# Patient Record
Sex: FEMALE | Race: WHITE | NOT HISPANIC OR LATINO | Employment: UNEMPLOYED | URBAN - METROPOLITAN AREA
[De-identification: names, ages, dates, MRNs, and addresses within clinical notes are randomized per-mention and may not be internally consistent; named-entity substitution may affect disease eponyms.]

---

## 2021-01-05 ENCOUNTER — OFFICE VISIT (OUTPATIENT)
Dept: OBGYN CLINIC | Facility: MEDICAL CENTER | Age: 21
End: 2021-01-05
Payer: COMMERCIAL

## 2021-01-05 VITALS
SYSTOLIC BLOOD PRESSURE: 122 MMHG | WEIGHT: 121.2 LBS | DIASTOLIC BLOOD PRESSURE: 74 MMHG | BODY MASS INDEX: 20.69 KG/M2 | HEIGHT: 64 IN

## 2021-01-05 DIAGNOSIS — Z30.011 ENCOUNTER FOR BCP (BIRTH CONTROL PILLS) INITIAL PRESCRIPTION: ICD-10-CM

## 2021-01-05 DIAGNOSIS — N92.0 MENORRHAGIA WITH REGULAR CYCLE: Primary | ICD-10-CM

## 2021-01-05 DIAGNOSIS — N94.6 DYSMENORRHEA: ICD-10-CM

## 2021-01-05 PROCEDURE — 99202 OFFICE O/P NEW SF 15 MIN: CPT | Performed by: ADVANCED PRACTICE MIDWIFE

## 2021-01-05 RX ORDER — NORGESTIMATE AND ETHINYL ESTRADIOL 0.25-0.035
1 KIT ORAL DAILY
Qty: 84 TABLET | Refills: 1 | Status: SHIPPED | OUTPATIENT
Start: 2021-01-05 | End: 2021-06-24

## 2021-01-05 NOTE — PROGRESS NOTES
Assessment:  24 y o  Joshua Mueller presenting with regular menstrual cycle with heavy bleeding days 2-4 and cramping  Plan:  Diagnoses and all orders for this visit:    Menorrhagia with regular cycle  -     norgestimate-ethinyl estradiol (ORTHO-CYCLEN) 0 25-35 MG-MCG per tablet; Take 1 tablet by mouth daily    Dysmenorrhea  -     norgestimate-ethinyl estradiol (ORTHO-CYCLEN) 0 25-35 MG-MCG per tablet; Take 1 tablet by mouth daily    Encounter for BCP (birth control pills) initial prescription  -     norgestimate-ethinyl estradiol (ORTHO-CYCLEN) 0 25-35 MG-MCG per tablet; Take 1 tablet by mouth daily      1  At this time Darral Nissen fees that an OCP is best hormonal option for current lifestyle  Risk/benefits, side effects and administration reviewed  Questions asked and answered  Verbalizes understanding  2  Not sexually active  Reviewed information related to condom use and STI  If any change in activity  Verbalizes understanding  I personally spent over half of a total 20 minutes face to face with the patient in counseling and discussion and/or coordination of care as described above      __________________________________________________________________    Subjective   Lacy Joseph is a 24 y o  Joshua Mueller with regular menses who is not sexually active presenting with complaints of heavy periods and cramping  Patient reports she has been dealing with this problem for 3-4 yrs  She reports during this time that her periods have been occurring every 28-32 days and lasting 5-8 days  She notes her heaviest flow lasts 2 days, during which time she uses a super tampons every 1-2 hours  She reports dysmenorrhea that occurs first 1-2 days of flow  She also has bloating/fluid retention (mild) and mild acne and pms  She denies intermenstrual spotting  She denies a hx of easy bruising,or significant bleeding with surgical/dental procedures      The following portions of the patient's history were reviewed and updated as appropriate: allergies, current medications, past family history, past medical history, past social history, past surgical history and problem list     Review of Systems  Pertinent items are noted in HPI  Objective  /74 (BP Location: Left arm, Patient Position: Sitting, Cuff Size: Standard)   Ht 5' 4" (1 626 m)   Wt 55 kg (121 lb 3 2 oz)   LMP 12/09/2020 (Exact Date)   BMI 20 80 kg/m²      Physical Exam:  General:   appears stated age, cooperative, alert normal mood and affect   Neck: Neck: normal, supple,trachea midline, no masses   Heart: regular rate and rhythm, S1, S2 normal, no murmur, click, rub or gallop   Lungs: clear to auscultation bilaterally   Abdomen: soft, non-tender, without masses or organomegaly   Vulva: deferred   Vagina: not evaluated   Uterus: not examined   Adnexa: not evaluated         Lab Review  Labs: At this time no labs have been ordered  Imaging  At this time no imaging has been ordered  Will try hormonal oral contraceptive prior to testing

## 2021-04-01 ENCOUNTER — OFFICE VISIT (OUTPATIENT)
Dept: OBGYN CLINIC | Facility: MEDICAL CENTER | Age: 21
End: 2021-04-01
Payer: COMMERCIAL

## 2021-04-01 VITALS
DIASTOLIC BLOOD PRESSURE: 72 MMHG | BODY MASS INDEX: 20.14 KG/M2 | SYSTOLIC BLOOD PRESSURE: 114 MMHG | WEIGHT: 118 LBS | HEIGHT: 64 IN

## 2021-04-01 DIAGNOSIS — N92.0 MENORRHAGIA WITH REGULAR CYCLE: ICD-10-CM

## 2021-04-01 DIAGNOSIS — N94.6 DYSMENORRHEA: ICD-10-CM

## 2021-04-01 DIAGNOSIS — Z30.41 SURVEILLANCE FOR BIRTH CONTROL, ORAL CONTRACEPTIVES: Primary | ICD-10-CM

## 2021-04-01 PROCEDURE — 99213 OFFICE O/P EST LOW 20 MIN: CPT | Performed by: ADVANCED PRACTICE MIDWIFE

## 2021-04-01 NOTE — PROGRESS NOTES
Assessment Diagnoses and all orders for this visit:    Dysmenorrhea    Menorrhagia with regular cycle    Encounter for BCP (birth control pills) continuation  - Doing well on OCP  - Will call if cramping has not improved after this pack   - RTO 1 yr    Plan  24 y o  female continuing OCP (estrogen/progesterone)  I personally spent over half of a total 15 minutes face to face with the patient in counseling and discussion and/or coordination of care as described above  Subjective      Claudia Pugh is a 24 y o  female who presents for contraception counseling  The patient does not have complaints today  The patient is sexually active  Pertinent past medical history: none  States that she had cramping the week before menses started for the first 2 months  She is currently on the week before her period and is not experiencing cramping  No problems with administration and is happy with how she is feeling at this time  Patient Active Problem List   Diagnosis    Dysmenorrhea       Past Medical History:   Diagnosis Date    Menorrhagia with regular cycle        History reviewed  No pertinent surgical history      Family History   Problem Relation Age of Onset    Hypertension Father     Seizures Sister     Diabetes Paternal Grandfather        Social History     Socioeconomic History    Marital status: Single     Spouse name: Not on file    Number of children: Not on file    Years of education: Not on file    Highest education level: Not on file   Occupational History    Not on file   Social Needs    Financial resource strain: Not on file    Food insecurity     Worry: Not on file     Inability: Not on file   [employer] needs     Medical: Not on file     Non-medical: Not on file   Tobacco Use    Smoking status: Never Smoker    Smokeless tobacco: Never Used   Substance and Sexual Activity    Alcohol use: Never     Frequency: Never     Comment: social    Drug use: Never    Sexual activity: Never Lifestyle    Physical activity     Days per week: Not on file     Minutes per session: Not on file    Stress: Not on file   Relationships    Social connections     Talks on phone: Not on file     Gets together: Not on file     Attends Mormonism service: Not on file     Active member of club or organization: Not on file     Attends meetings of clubs or organizations: Not on file     Relationship status: Not on file    Intimate partner violence     Fear of current or ex partner: Not on file     Emotionally abused: Not on file     Physically abused: Not on file     Forced sexual activity: Not on file   Other Topics Concern    Not on file   Social History Narrative    Not on file          Current Outpatient Medications:     norgestimate-ethinyl estradiol (ORTHO-CYCLEN) 0 25-35 MG-MCG per tablet, Take 1 tablet by mouth daily, Disp: 84 tablet, Rfl: 1    No Known Allergies    Review of Systems  Constitutional :no fever, feels well, no tiredness, no recent weight gain or loss  ENT: no ear ache, no loss of hearing, no nosebleeds or nasal discharge, no sore throat or hoarseness  Cardiovascular: no complaints of slow or fast heart beat, no chest pain, no palpitations, no leg claudication or lower extremity edema  Respiratory: no complaints of shortness of shortness of breath, no ATWOOD  Breasts:no complaints of breast pain, breast lump, or nipple discharge  Gastrointestinal: no complaints of abdominal pain, constipation, nausea, vomiting, or diarrhea or bloody stools  Genitourinary : no complaints of dysuria, incontinence, pelvic pain, no dysmenorrhea, vaginal discharge or abnormal vaginal bleeding and as noted in HPI  Musculoskeletal: no complaints of arthralgia, no myalgia, no joint swelling or stiffness, no limb pain or swelling    Integumentary: no complaints of skin rash or lesion, itching or dry skin  Neurological: no complaints of headache, no confusion, no numbness or tingling, no dizziness or fainting    Objective     /72 (BP Location: Left arm, Patient Position: Sitting, Cuff Size: Standard)   Ht 5' 4" (1 626 m)   Wt 53 5 kg (118 lb)   LMP 03/09/2021 (Exact Date)   BMI 20 25 kg/m²     General:   appears stated age, cooperative, alert normal mood and affect   Neck: normal, supple,trachea midline, no masses   Heart: regular rate and rhythm, S1, S2 normal, no murmur, click, rub or gallop   Skin normal skin turgor and no rashes     Psychiatric orientation to person, place, and time: normal  mood and affect: normal

## 2021-06-24 DIAGNOSIS — N94.6 DYSMENORRHEA: ICD-10-CM

## 2021-06-24 DIAGNOSIS — N92.0 MENORRHAGIA WITH REGULAR CYCLE: ICD-10-CM

## 2021-06-24 DIAGNOSIS — Z30.011 ENCOUNTER FOR BCP (BIRTH CONTROL PILLS) INITIAL PRESCRIPTION: ICD-10-CM

## 2021-06-29 DIAGNOSIS — N94.6 DYSMENORRHEA: ICD-10-CM

## 2021-06-29 DIAGNOSIS — Z30.011 ENCOUNTER FOR BCP (BIRTH CONTROL PILLS) INITIAL PRESCRIPTION: ICD-10-CM

## 2021-06-29 DIAGNOSIS — N92.0 MENORRHAGIA WITH REGULAR CYCLE: ICD-10-CM

## 2021-06-30 ENCOUNTER — OFFICE VISIT (OUTPATIENT)
Dept: FAMILY MEDICINE CLINIC | Facility: CLINIC | Age: 21
End: 2021-06-30
Payer: COMMERCIAL

## 2021-06-30 VITALS
TEMPERATURE: 98.1 F | WEIGHT: 120 LBS | SYSTOLIC BLOOD PRESSURE: 100 MMHG | DIASTOLIC BLOOD PRESSURE: 62 MMHG | BODY MASS INDEX: 20.49 KG/M2 | HEART RATE: 64 BPM | RESPIRATION RATE: 16 BRPM | HEIGHT: 64 IN

## 2021-06-30 DIAGNOSIS — Z23 ENCOUNTER FOR IMMUNIZATION: ICD-10-CM

## 2021-06-30 DIAGNOSIS — Z00.00 ANNUAL PHYSICAL EXAM: Primary | ICD-10-CM

## 2021-06-30 DIAGNOSIS — Z11.1 SCREENING-PULMONARY TB: ICD-10-CM

## 2021-06-30 PROCEDURE — 99385 PREV VISIT NEW AGE 18-39: CPT | Performed by: NURSE PRACTITIONER

## 2021-06-30 PROCEDURE — 90471 IMMUNIZATION ADMIN: CPT

## 2021-06-30 PROCEDURE — 86580 TB INTRADERMAL TEST: CPT

## 2021-06-30 PROCEDURE — 90715 TDAP VACCINE 7 YRS/> IM: CPT

## 2021-06-30 NOTE — PROGRESS NOTES
ADULT ANNUAL PHYSICAL  [facility]    NAME: Dinh Mcelroy  AGE: 22 y o  SEX: female  : 2000     DATE: 2021     Assessment and Plan:  1  TDAP today  2  TB skin test for school program   3  F/u in 1 year for annual physical      Problem List Items Addressed This Visit     None      Visit Diagnoses     Annual physical exam    -  Primary    Encounter for immunization        Relevant Orders    TDAP VACCINE GREATER THAN OR EQUAL TO 8YO IM    Screening-pulmonary TB        Relevant Orders    TB Skin Test          Immunizations and preventive care screenings were discussed with patient today  Appropriate education was printed on patient's after visit summary  Counseling:  Alcohol/drug use: discussed moderation in alcohol intake, the recommendations for healthy alcohol use, and avoidance of illicit drug use  Dental Health: discussed importance of regular tooth brushing, flossing, and dental visits  Injury prevention: discussed safety/seat belts, safety helmets, smoke detectors, carbon dioxide detectors, and smoking near bedding or upholstery  Sexual health: discussed sexually transmitted diseases, partner selection, use of condoms, avoidance of unintended pregnancy, and contraceptive alternatives  · Exercise: the importance of regular exercise/physical activity was discussed  Recommend exercise 3-5 times per week for at least 30 minutes  Return in about 1 year (around 2022) for Annual physical      Chief Complaint:     Chief Complaint   Patient presents with    Annual Exam      History of Present Illness:     Adult Annual Physical   Patient here for a comprehensive physical exam  The patient reports no problems  Diet and Physical Activity  · Diet/Nutrition: well balanced diet, consuming 3-5 servings of fruits/vegetables daily and adequate fiber intake     · Exercise: walking, moderate cardiovascular exercise, 3-4 times a week on average and 30-60 minutes on average  Depression Screening  PHQ-9 Depression Screening    PHQ-9:   Frequency of the following problems over the past two weeks:      Little interest or pleasure in doing things: 0 - not at all  Feeling down, depressed, or hopeless: 0 - not at all  PHQ-2 Score: 0       General Health  · Sleep: sleeps well and gets 7-8 hours of sleep on average  · Hearing: normal - bilateral   · Vision: no vision problems, most recent eye exam <1 year ago and wears contacts  · Dental: regular dental visits, brushes teeth twice daily and flosses teeth occasionally  /GYN Health  · Last menstrual period: 06/22/2021  · Contraceptive method: oral contraceptives  · History of STDs?: no      Review of Systems:     Review of Systems   Constitutional: Negative  Negative for chills and fever  HENT: Negative  Negative for ear pain and sore throat  Eyes: Negative  Negative for pain and visual disturbance  Respiratory: Negative  Negative for cough and shortness of breath  Cardiovascular: Negative  Negative for chest pain, palpitations and leg swelling  Gastrointestinal: Negative  Negative for abdominal distention, abdominal pain, constipation, diarrhea, nausea and vomiting  Genitourinary: Negative  Negative for dysuria and hematuria  Musculoskeletal: Negative  Negative for arthralgias and back pain  Skin: Negative for color change and rash  Neurological: Negative  Negative for dizziness, seizures and syncope  Psychiatric/Behavioral: Negative  All other systems reviewed and are negative       Past Medical History:     Past Medical History:   Diagnosis Date    Menorrhagia with regular cycle       Past Surgical History:     Past Surgical History:   Procedure Laterality Date    WISDOM TOOTH EXTRACTION        Social History:     Social History     Socioeconomic History    Marital status: Single     Spouse name: None    Number of children: None    Years of education: None    Highest education level: None   Occupational History    None   Tobacco Use    Smoking status: Never Smoker    Smokeless tobacco: Never Used   Vaping Use    Vaping Use: Never used   Substance and Sexual Activity    Alcohol use: Never     Comment: social    Drug use: Never    Sexual activity: Never   Other Topics Concern    None   Social History Narrative    None     Social Determinants of Health     Financial Resource Strain:     Difficulty of Paying Living Expenses:    Food Insecurity:     Worried About Running Out of Food in the Last Year:     Ran Out of Food in the Last Year:    Transportation Needs:     Lack of Transportation (Medical):  Lack of Transportation (Non-Medical):    Physical Activity:     Days of Exercise per Week:     Minutes of Exercise per Session:    Stress:     Feeling of Stress :    Social Connections:     Frequency of Communication with Friends and Family:     Frequency of Social Gatherings with Friends and Family:     Attends Sikh Services:     Active Member of Clubs or Organizations:     Attends Club or Organization Meetings:     Marital Status:    Intimate Partner Violence:     Fear of Current or Ex-Partner:     Emotionally Abused:     Physically Abused:     Sexually Abused:       Family History:     Family History   Problem Relation Age of Onset    Hypertension Father     Seizures Sister     Diabetes Paternal Grandfather       Current Medications:     Current Outpatient Medications   Medication Sig Dispense Refill    Sprintec 28 0 25-35 MG-MCG per tablet TAKE ONE TABLET BY MOUTH DAILY 84 tablet 0     No current facility-administered medications for this visit  Allergies:     No Known Allergies   Physical Exam:     /62   Pulse 64   Temp 98 1 °F (36 7 °C) (Oral)   Resp 16   Ht 5' 4" (1 626 m)   Wt 54 4 kg (120 lb)   BMI 20 60 kg/m²     Physical Exam  Vitals and nursing note reviewed  Constitutional:       General: She is not in acute distress  Appearance: Normal appearance  She is well-developed  HENT:      Head: Normocephalic and atraumatic  Eyes:      Conjunctiva/sclera: Conjunctivae normal    Neck:      Vascular: No carotid bruit  Cardiovascular:      Rate and Rhythm: Normal rate and regular rhythm  Pulses: Normal pulses  Heart sounds: Normal heart sounds  No murmur heard  Pulmonary:      Effort: Pulmonary effort is normal  No respiratory distress  Breath sounds: Normal breath sounds  No wheezing  Abdominal:      General: There is no distension  Palpations: Abdomen is soft  There is no mass  Tenderness: There is no abdominal tenderness  There is no guarding or rebound  Hernia: No hernia is present  Musculoskeletal:         General: Normal range of motion  Cervical back: Normal range of motion and neck supple  Comments: Normal spine   Lymphadenopathy:      Cervical: No cervical adenopathy  Skin:     General: Skin is warm and dry  Capillary Refill: Capillary refill takes less than 2 seconds  Neurological:      General: No focal deficit present  Mental Status: She is alert and oriented to person, place, and time  Mental status is at baseline  Motor: No weakness  Gait: Gait normal    Psychiatric:         Mood and Affect: Mood normal          Behavior: Behavior normal          Thought Content:  Thought content normal          Judgment: Judgment normal           Romelia Bertrand, [address] [facility]

## 2021-06-30 NOTE — PATIENT INSTRUCTIONS

## 2021-07-02 ENCOUNTER — CLINICAL SUPPORT (OUTPATIENT)
Dept: FAMILY MEDICINE CLINIC | Facility: CLINIC | Age: 21
End: 2021-07-02
Payer: COMMERCIAL

## 2021-07-02 DIAGNOSIS — Z11.1 ENCOUNTER FOR PPD SKIN TEST READING: ICD-10-CM

## 2021-07-02 PROCEDURE — 86580 TB INTRADERMAL TEST: CPT | Performed by: NURSE PRACTITIONER

## 2021-07-06 RX ORDER — NORGESTIMATE AND ETHINYL ESTRADIOL 0.25-0.035
1 KIT ORAL DAILY
Qty: 84 TABLET | Refills: 2 | Status: SHIPPED | OUTPATIENT
Start: 2021-07-06 | End: 2021-09-15 | Stop reason: SDUPTHER

## 2021-09-15 DIAGNOSIS — N94.6 DYSMENORRHEA: ICD-10-CM

## 2021-09-15 DIAGNOSIS — N92.0 MENORRHAGIA WITH REGULAR CYCLE: ICD-10-CM

## 2021-09-15 DIAGNOSIS — Z30.011 ENCOUNTER FOR BCP (BIRTH CONTROL PILLS) INITIAL PRESCRIPTION: ICD-10-CM

## 2021-09-15 RX ORDER — NORGESTIMATE AND ETHINYL ESTRADIOL 0.25-0.035
1 KIT ORAL DAILY
Qty: 84 TABLET | Refills: 2 | Status: SHIPPED | OUTPATIENT
Start: 2021-09-15 | End: 2022-04-13 | Stop reason: SDUPTHER

## 2022-03-21 ENCOUNTER — TELEPHONE (OUTPATIENT)
Dept: FAMILY MEDICINE CLINIC | Facility: CLINIC | Age: 22
End: 2022-03-21

## 2022-04-13 ENCOUNTER — ANNUAL EXAM (OUTPATIENT)
Dept: OBGYN CLINIC | Facility: MEDICAL CENTER | Age: 22
End: 2022-04-13
Payer: COMMERCIAL

## 2022-04-13 VITALS
WEIGHT: 127.5 LBS | SYSTOLIC BLOOD PRESSURE: 118 MMHG | BODY MASS INDEX: 21.77 KG/M2 | HEIGHT: 64 IN | DIASTOLIC BLOOD PRESSURE: 70 MMHG

## 2022-04-13 DIAGNOSIS — N94.6 DYSMENORRHEA: ICD-10-CM

## 2022-04-13 DIAGNOSIS — N92.0 MENORRHAGIA WITH REGULAR CYCLE: ICD-10-CM

## 2022-04-13 DIAGNOSIS — Z01.419 ENCOUNTER FOR GYNECOLOGICAL EXAMINATION (GENERAL) (ROUTINE) WITHOUT ABNORMAL FINDINGS: Primary | ICD-10-CM

## 2022-04-13 DIAGNOSIS — Z30.011 ENCOUNTER FOR BCP (BIRTH CONTROL PILLS) INITIAL PRESCRIPTION: ICD-10-CM

## 2022-04-13 PROCEDURE — S0612 ANNUAL GYNECOLOGICAL EXAMINA: HCPCS | Performed by: OBSTETRICS & GYNECOLOGY

## 2022-04-13 RX ORDER — NORGESTIMATE AND ETHINYL ESTRADIOL 0.25-0.035
1 KIT ORAL DAILY
Qty: 84 TABLET | Refills: 3 | Status: SHIPPED | OUTPATIENT
Start: 2022-04-13

## 2022-04-13 NOTE — PATIENT INSTRUCTIONS
Thank you for your confidence in our team    We appreciate you and welcome your feedback  If you receive a survey from us, please take a few moments to let us know how we are doing     Sincerely,   Holly Pickens MD

## 2022-04-13 NOTE — PROGRESS NOTES
ASSESSMENT & PLAN: Diagnoses and all orders for this visit:    Encounter for gynecological examination (general) (routine) without abnormal findings    Menorrhagia with regular cycle  -     norgestimate-ethinyl estradiol (Sprintec 28) 0 25-35 MG-MCG per tablet; Take 1 tablet by mouth daily    Dysmenorrhea  -     norgestimate-ethinyl estradiol (Sprintec 28) 0 25-35 MG-MCG per tablet; Take 1 tablet by mouth daily    Encounter for BCP (birth control pills) initial prescription  -     norgestimate-ethinyl estradiol (Sprintec 28) 0 25-35 MG-MCG per tablet; Take 1 tablet by mouth daily         1  Routine well woman exam done today  2  Pap:  The patient's pap is not up applicable  Pap was not done today  Current ASCCP Guidelines reviewed  3   STD testing was not done  Patient declined  4   Patient has had her Gardasil vaccination  Recommendations reviewed  5  The following were reviewed in today's visit: adequate intake of calcium and vitamin D, exercise and healthy diet  6  F/u in 1 year for next routine gyn exam   7  Refill of current birth control pill sent to pharmacy  CC:  Annual Gynecologic Examination    HPI: Jameson Mckenzie is a 22 y o   who presents for annual gynecologic examination  She has the following concerns:  No issues with current bcp  Pt is going to [facility] to study abroad for one month  Health Maintenance:    Patient describes her health as excellent  The last health maintenance visit was 1 years ago  Patient does not have weight concerns  She exercises 3-4 days per week with run or lift weights  She does wear her seatbelt routinely  She does perform irregular monthly self breast exams  She does feels safe at home  Patients does not follow a special diet  Patients gets 1 servings of dairy or calcium rich foods a day      Last pap: n/a      Patient Active Problem List   Diagnosis    Dysmenorrhea       Past Medical History:   Diagnosis Date    Menorrhagia with regular cycle        Past Surgical History:   Procedure Laterality Date    WISDOM TOOTH EXTRACTION         Past OB/Gyn History:  Pt does not have menstrual issues  On OCP's  History of sexually transmitted infection: No   History of abnormal pap smears: n/a  Patient is not currently sexually active  heterosexual  The current method of family planning is OCP (estrogen/progesterone)  Family History   Problem Relation Age of Onset    Hypertension Father     Seizures Sister     Diabetes Paternal Grandfather        Social History:  Social History     Socioeconomic History    Marital status: Single     Spouse name: Not on file    Number of children: Not on file    Years of education: Not on file    Highest education level: Not on file   Occupational History    Not on file   Tobacco Use    Smoking status: Never Smoker    Smokeless tobacco: Never Used   Vaping Use    Vaping Use: Never used   Substance and Sexual Activity    Alcohol use: Never     Comment: social    Drug use: Never    Sexual activity: Never   Other Topics Concern    Not on file   Social History Narrative    Not on file     Social Determinants of Health     Financial Resource Strain: Not on file   Food Insecurity: Not on file   Transportation Needs: Not on file   Physical Activity: Not on file   Stress: Not on file   Social Connections: Not on file   Intimate Partner Violence: Not on file   Housing Stability: Not on file     Presently lives with parents, brother and sister  Patient is currently employed nursing student  No Known Allergies      Current Outpatient Medications:     norgestimate-ethinyl estradiol (Sprintec 28) 0 25-35 MG-MCG per tablet, Take 1 tablet by mouth daily, Disp: 84 tablet, Rfl: 3      Review of Systems  Constitutional :no fever, feels well, no tiredness, recent weight gain  ENT: no ear ache, no loss of hearing, no nosebleeds or nasal discharge, no sore throat or hoarseness    Cardiovascular: no complaints of slow or fast heart beat, no chest pain, no palpitations, no leg claudication or lower extremity edema  Respiratory: no complaints of shortness of shortness of breath, no ATWOOD  Breasts:no complaints of breast pain, breast lump, or nipple discharge  Gastrointestinal: no complaints of abdominal pain, constipation, nausea, vomiting, or diarrhea or bloody stools  Genitourinary : no complaints of dysuria, incontinence, pelvic pain, no dysmenorrhea, vaginal discharge or abnormal vaginal bleeding and as noted in HPI  Musculoskeletal: no complaints of arthralgia, no myalgia, no joint swelling or stiffness, no limb pain or swelling  Integumentary: no complaints of skin rash or lesion, itching or dry skin  Neurological: no complaints of headache, no confusion, no numbness or tingling, no dizziness or fainting      Physical Exam:   /70   Ht 5' 4" (1 626 m)   Wt 57 8 kg (127 lb 8 oz)   LMP 04/06/2022   Breastfeeding No   BMI 21 89 kg/m²     General: appears stated age, cooperative, alert normal mood and affect   Psychiatric oriented to person, place and time  Mood and affect normal   Neck: normal, supple,trachea midline, no masses    Thyroid: normal, no thyromegaly   Heart: regular rate and rhythm, S1, S2 normal, no murmur, click, rub or gallop   Lungs: clear to auscultation bilaterally, no increased work of breathing or signs of respiratory distress   Abdomen: soft, non-tender, without masses or organomegaly

## 2022-06-30 ENCOUNTER — TELEPHONE (OUTPATIENT)
Dept: FAMILY MEDICINE CLINIC | Facility: CLINIC | Age: 22
End: 2022-06-30

## 2022-07-05 ENCOUNTER — CLINICAL SUPPORT (OUTPATIENT)
Dept: FAMILY MEDICINE CLINIC | Facility: CLINIC | Age: 22
End: 2022-07-05
Payer: COMMERCIAL

## 2022-07-05 DIAGNOSIS — Z11.1 SCREENING FOR TUBERCULOSIS: Primary | ICD-10-CM

## 2022-07-05 PROCEDURE — 86580 TB INTRADERMAL TEST: CPT

## 2022-07-08 ENCOUNTER — CLINICAL SUPPORT (OUTPATIENT)
Dept: FAMILY MEDICINE CLINIC | Facility: CLINIC | Age: 22
End: 2022-07-08

## 2022-07-08 DIAGNOSIS — Z11.1 ENCOUNTER FOR PPD SKIN TEST READING: Primary | ICD-10-CM

## 2022-07-27 ENCOUNTER — APPOINTMENT (EMERGENCY)
Dept: CT IMAGING | Facility: HOSPITAL | Age: 22
End: 2022-07-27
Payer: COMMERCIAL

## 2022-07-27 ENCOUNTER — HOSPITAL ENCOUNTER (EMERGENCY)
Facility: HOSPITAL | Age: 22
Discharge: HOME | End: 2022-07-27
Attending: EMERGENCY MEDICINE
Payer: COMMERCIAL

## 2022-07-27 ENCOUNTER — TELEPHONE (OUTPATIENT)
Dept: OBGYN CLINIC | Facility: MEDICAL CENTER | Age: 22
End: 2022-07-27

## 2022-07-27 VITALS
OXYGEN SATURATION: 99 % | BODY MASS INDEX: 21.34 KG/M2 | HEIGHT: 64 IN | WEIGHT: 125 LBS | SYSTOLIC BLOOD PRESSURE: 121 MMHG | HEART RATE: 60 BPM | DIASTOLIC BLOOD PRESSURE: 75 MMHG | RESPIRATION RATE: 18 BRPM | TEMPERATURE: 97.7 F

## 2022-07-27 DIAGNOSIS — R10.31 RIGHT LOWER QUADRANT ABDOMINAL PAIN: Primary | ICD-10-CM

## 2022-07-27 LAB
ALBUMIN SERPL BCP-MCNC: 3.8 G/DL (ref 3.5–5)
ALP SERPL-CCNC: 45 U/L (ref 46–116)
ALT SERPL W P-5'-P-CCNC: 18 U/L (ref 12–78)
ANION GAP SERPL CALCULATED.3IONS-SCNC: 8 MMOL/L (ref 4–13)
AST SERPL W P-5'-P-CCNC: 23 U/L (ref 5–45)
BASOPHILS # BLD AUTO: 0.07 THOUSANDS/ΜL (ref 0–0.1)
BASOPHILS NFR BLD AUTO: 1 % (ref 0–1)
BILIRUB SERPL-MCNC: 0.2 MG/DL (ref 0.2–1)
BILIRUB UR QL STRIP: NEGATIVE
BUN SERPL-MCNC: 14 MG/DL (ref 5–25)
CALCIUM SERPL-MCNC: 9.3 MG/DL (ref 8.3–10.1)
CHLORIDE SERPL-SCNC: 104 MMOL/L (ref 96–108)
CLARITY UR: CLEAR
CO2 SERPL-SCNC: 27 MMOL/L (ref 21–32)
COLOR UR: YELLOW
CREAT SERPL-MCNC: 1.11 MG/DL (ref 0.6–1.3)
EOSINOPHIL # BLD AUTO: 0.15 THOUSAND/ΜL (ref 0–0.61)
EOSINOPHIL NFR BLD AUTO: 2 % (ref 0–6)
ERYTHROCYTE [DISTWIDTH] IN BLOOD BY AUTOMATED COUNT: 11.9 % (ref 11.6–15.1)
EXT PREG TEST URINE: NEGATIVE
EXT. CONTROL ED NAV: NORMAL
GFR SERPL CREATININE-BSD FRML MDRD: 71 ML/MIN/1.73SQ M
GLUCOSE SERPL-MCNC: 98 MG/DL (ref 65–140)
GLUCOSE UR STRIP-MCNC: NEGATIVE MG/DL
HCT VFR BLD AUTO: 39.3 % (ref 34.8–46.1)
HGB BLD-MCNC: 12.9 G/DL (ref 11.5–15.4)
HGB UR QL STRIP.AUTO: NEGATIVE
IMM GRANULOCYTES # BLD AUTO: 0.01 THOUSAND/UL (ref 0–0.2)
IMM GRANULOCYTES NFR BLD AUTO: 0 % (ref 0–2)
KETONES UR STRIP-MCNC: NEGATIVE MG/DL
LEUKOCYTE ESTERASE UR QL STRIP: NEGATIVE
LIPASE SERPL-CCNC: 122 U/L (ref 73–393)
LYMPHOCYTES # BLD AUTO: 2.75 THOUSANDS/ΜL (ref 0.6–4.47)
LYMPHOCYTES NFR BLD AUTO: 35 % (ref 14–44)
MCH RBC QN AUTO: 30.9 PG (ref 26.8–34.3)
MCHC RBC AUTO-ENTMCNC: 32.8 G/DL (ref 31.4–37.4)
MCV RBC AUTO: 94 FL (ref 82–98)
MONOCYTES # BLD AUTO: 0.63 THOUSAND/ΜL (ref 0.17–1.22)
MONOCYTES NFR BLD AUTO: 8 % (ref 4–12)
NEUTROPHILS # BLD AUTO: 4.24 THOUSANDS/ΜL (ref 1.85–7.62)
NEUTS SEG NFR BLD AUTO: 54 % (ref 43–75)
NITRITE UR QL STRIP: NEGATIVE
NRBC BLD AUTO-RTO: 0 /100 WBCS
PH UR STRIP.AUTO: 5.5 [PH]
PLATELET # BLD AUTO: 220 THOUSANDS/UL (ref 149–390)
PMV BLD AUTO: 10.7 FL (ref 8.9–12.7)
POTASSIUM SERPL-SCNC: 4.5 MMOL/L (ref 3.5–5.3)
PROT SERPL-MCNC: 8 G/DL (ref 6.4–8.4)
PROT UR STRIP-MCNC: NEGATIVE MG/DL
RBC # BLD AUTO: 4.17 MILLION/UL (ref 3.81–5.12)
SODIUM SERPL-SCNC: 139 MMOL/L (ref 135–147)
SP GR UR STRIP.AUTO: 1.01 (ref 1–1.03)
UROBILINOGEN UR QL STRIP.AUTO: 0.2 E.U./DL
WBC # BLD AUTO: 7.85 THOUSAND/UL (ref 4.31–10.16)

## 2022-07-27 PROCEDURE — 96374 THER/PROPH/DIAG INJ IV PUSH: CPT

## 2022-07-27 PROCEDURE — 80053 COMPREHEN METABOLIC PANEL: CPT | Performed by: EMERGENCY MEDICINE

## 2022-07-27 PROCEDURE — 81003 URINALYSIS AUTO W/O SCOPE: CPT | Performed by: EMERGENCY MEDICINE

## 2022-07-27 PROCEDURE — 99284 EMERGENCY DEPT VISIT MOD MDM: CPT | Performed by: EMERGENCY MEDICINE

## 2022-07-27 PROCEDURE — 99284 EMERGENCY DEPT VISIT MOD MDM: CPT

## 2022-07-27 PROCEDURE — 36415 COLL VENOUS BLD VENIPUNCTURE: CPT

## 2022-07-27 PROCEDURE — 85025 COMPLETE CBC W/AUTO DIFF WBC: CPT | Performed by: EMERGENCY MEDICINE

## 2022-07-27 PROCEDURE — 74177 CT ABD & PELVIS W/CONTRAST: CPT

## 2022-07-27 PROCEDURE — 81025 URINE PREGNANCY TEST: CPT | Performed by: EMERGENCY MEDICINE

## 2022-07-27 PROCEDURE — G1004 CDSM NDSC: HCPCS

## 2022-07-27 PROCEDURE — 83690 ASSAY OF LIPASE: CPT | Performed by: EMERGENCY MEDICINE

## 2022-07-27 RX ORDER — KETOROLAC TROMETHAMINE 30 MG/ML
15 INJECTION, SOLUTION INTRAMUSCULAR; INTRAVENOUS ONCE
Status: COMPLETED | OUTPATIENT
Start: 2022-07-27 | End: 2022-07-27

## 2022-07-27 RX ADMIN — IOHEXOL 60 ML: 350 INJECTION, SOLUTION INTRAVENOUS at 20:50

## 2022-07-27 RX ADMIN — KETOROLAC TROMETHAMINE 15 MG: 30 INJECTION, SOLUTION INTRAMUSCULAR at 20:42

## 2022-07-28 NOTE — ED PROVIDER NOTES
History  Chief Complaint   Patient presents with    Abdominal Pain     RLQ pain started 7/26  Last took advil 1230  Patient is a 25year old female who presents with right lower quadrant abdominal pain  Patient reports that she developed right lower quadrant abdominal pain, constant with waxing and waning, radiating across to the right flank, mild to moderate in intensity, crampy and occasionally sharp in nature, associated with nausea  Denies any fevers, chills, vomiting, diarrhea, dysuria, hematuria, vaginal discharge  Prior to Admission Medications   Prescriptions Last Dose Informant Patient Reported? Taking?   norgestimate-ethinyl estradiol (Sprintec 28) 0 25-35 MG-MCG per tablet   No No   Sig: Take 1 tablet by mouth daily      Facility-Administered Medications: None       Past Medical History:   Diagnosis Date    Menorrhagia with regular cycle        Past Surgical History:   Procedure Laterality Date    WISDOM TOOTH EXTRACTION         Family History   Problem Relation Age of Onset    Hypertension Father     Seizures Sister     Diabetes Paternal Grandfather      I have reviewed and agree with the history as documented  E-Cigarette/Vaping    E-Cigarette Use Never User      E-Cigarette/Vaping Substances    Nicotine No     THC No     CBD No     Flavoring No     Other No     Unknown No      Social History     Tobacco Use    Smoking status: Never Smoker    Smokeless tobacco: Never Used   Vaping Use    Vaping Use: Never used   Substance Use Topics    Alcohol use: Yes     Comment: social    Drug use: Never       Review of Systems   Constitutional: Negative for chills and fever  HENT: Negative for congestion and rhinorrhea  Eyes: Negative for photophobia and visual disturbance  Respiratory: Negative for cough and shortness of breath  Cardiovascular: Negative for chest pain and palpitations  Gastrointestinal: Positive for abdominal pain and nausea   Negative for constipation, diarrhea and vomiting  Genitourinary: Positive for vaginal bleeding (currently menstruating)  Negative for dysuria, flank pain, hematuria, pelvic pain, vaginal discharge and vaginal pain  Musculoskeletal: Negative for back pain and neck pain  Skin: Negative for color change and pallor  Neurological: Negative for dizziness, weakness, light-headedness, numbness and headaches  Physical Exam  Physical Exam  Vitals and nursing note reviewed  Constitutional:       General: She is not in acute distress  Appearance: Normal appearance  She is not ill-appearing, toxic-appearing or diaphoretic  HENT:      Head: Normocephalic and atraumatic  Mouth/Throat:      Mouth: Mucous membranes are moist    Eyes:      Conjunctiva/sclera: Conjunctivae normal       Pupils: Pupils are equal, round, and reactive to light  Cardiovascular:      Rate and Rhythm: Normal rate and regular rhythm  Pulses: Normal pulses  Heart sounds: Normal heart sounds  No murmur heard  Pulmonary:      Effort: Pulmonary effort is normal  No respiratory distress  Breath sounds: Normal breath sounds  No stridor  No wheezing, rhonchi or rales  Chest:      Chest wall: No tenderness  Abdominal:      General: Bowel sounds are normal  There is no distension  Palpations: Abdomen is soft  Tenderness: There is abdominal tenderness in the right lower quadrant  There is no right CVA tenderness, left CVA tenderness, guarding or rebound  Negative signs include Nathan's sign, Rovsing's sign, McBurney's sign and psoas sign  Hernia: No hernia is present  Musculoskeletal:      Cervical back: Neck supple  Right lower leg: No edema  Left lower leg: No edema  Skin:     General: Skin is warm and dry  Neurological:      General: No focal deficit present  Mental Status: She is alert and oriented to person, place, and time  Mental status is at baseline     Psychiatric:         Mood and Affect: Mood normal          Behavior: Behavior normal          Vital Signs  ED Triage Vitals [07/27/22 1644]   Temperature Pulse Respirations Blood Pressure SpO2   97 7 °F (36 5 °C) 79 16 127/73 98 %      Temp Source Heart Rate Source Patient Position - Orthostatic VS BP Location FiO2 (%)   Temporal Monitor Sitting Right arm --      Pain Score       5           Vitals:    07/27/22 1644 07/27/22 2110 07/27/22 2122 07/27/22 2137   BP: 127/73 121/75     Pulse: 79 56 60 60   Patient Position - Orthostatic VS: Sitting Lying           Visual Acuity      ED Medications  Medications   ketorolac (TORADOL) injection 15 mg (15 mg Intravenous Given 7/27/22 2042)   iohexol (OMNIPAQUE) 350 MG/ML injection (SINGLE-DOSE) 100 mL (60 mL Intravenous Given 7/27/22 2050)       Diagnostic Studies  Results Reviewed     Procedure Component Value Units Date/Time    UA w Reflex to Microscopic w Reflex to Culture [[id number]] Collected: 07/27/22 1648    Lab Status: Final result Specimen: Urine, Clean Catch Updated: 07/27/22 1845     Color, UA Yellow     Clarity, UA Clear     Specific Gravity, UA 1 010     pH, UA 5 5     Leukocytes, UA Negative     Nitrite, UA Negative     Protein, UA Negative mg/dl      Glucose, UA Negative mg/dl      Ketones, UA Negative mg/dl      Urobilinogen, UA 0 2 E U /dl      Bilirubin, UA Negative     Occult Blood, UA Negative    POCT pregnancy, urine [[id number]]  (Normal) Resulted: 07/27/22 1735    Lab Status: Final result Updated: 07/27/22 1735     EXT PREG TEST UR (Ref: Negative) negative     Control valid    Comprehensive metabolic panel [[id number]]  (Abnormal) Collected: 07/27/22 1648    Lab Status: Final result Specimen: Blood from Arm, Left Updated: 07/27/22 1723     Sodium 139 mmol/L      Potassium 4 5 mmol/L      Chloride 104 mmol/L      CO2 27 mmol/L      ANION GAP 8 mmol/L      BUN 14 mg/dL      Creatinine 1 11 mg/dL      Glucose 98 mg/dL      Calcium 9 3 mg/dL      AST 23 U/L      ALT 18 U/L      Alkaline Phosphatase 45 U/L      Total Protein 8 0 g/dL      Albumin 3 8 g/dL      Total Bilirubin 0 20 mg/dL      eGFR 71 ml/min/1 73sq m     Narrative:      [place] guidelines for Chronic Kidney Disease (CKD):     Stage 1 with normal or high GFR (GFR > 90 mL/min/1 73 square meters)    Stage 2 Mild CKD (GFR = 60-89 mL/min/1 73 square meters)    Stage 3A Moderate CKD (GFR = 45-59 mL/min/1 73 square meters)    Stage 3B Moderate CKD (GFR = 30-44 mL/min/1 73 square meters)    Stage 4 Severe CKD (GFR = 15-29 mL/min/1 73 square meters)    Stage 5 End Stage CKD (GFR <15 mL/min/1 73 square meters)  Note: GFR calculation is accurate only with a steady state creatinine    Lipase [[id number]]  (Normal) Collected: 07/27/22 1648    Lab Status: Final result Specimen: Blood from Arm, Left Updated: 07/27/22 1723     Lipase 122 u/L     CBC and differential [[id number]] Collected: 07/27/22 1648    Lab Status: Final result Specimen: Blood from Arm, Left Updated: 07/27/22 1656     WBC 7 85 Thousand/uL      RBC 4 17 Million/uL      Hemoglobin 12 9 g/dL      Hematocrit 39 3 %      MCV 94 fL      MCH 30 9 pg      MCHC 32 8 g/dL      RDW 11 9 %      MPV 10 7 fL      Platelets 977 Thousands/uL      nRBC 0 /100 WBCs      Neutrophils Relative 54 %      Immat GRANS % 0 %      Lymphocytes Relative 35 %      Monocytes Relative 8 %      Eosinophils Relative 2 %      Basophils Relative 1 %      Neutrophils Absolute 4 24 Thousands/µL      Immature Grans Absolute 0 01 Thousand/uL      Lymphocytes Absolute 2 75 Thousands/µL      Monocytes Absolute 0 63 Thousand/µL      Eosinophils Absolute 0 15 Thousand/µL      Basophils Absolute 0 07 Thousands/µL                  CT abdomen pelvis with contrast   Final Result by Efren Pineda MD (07/27 2059)      No acute abdominopelvic pathology identified              Workstation performed: [id number]                    Procedures  Procedures         ED Course  ED Course as of 07/27/22 2251   Wed Jul 27, 2022   2110 Most of the appendix is felt to be visible as a small tubular air-filled structure in the right lower abdomen  The distal most portion is difficult to confidently see as it seems to merge imperceptibly with the right adnexal structures and adjacent small bowel loops  Overall, nothing to suggest acute appendicitis  MDM  Number of Diagnoses or Management Options  Right lower quadrant abdominal pain  Diagnosis management comments: Assessment and plan:  41-year-old female who presents with right lower quadrant abdominal pain times 48 hours  Differential includes appendicitis versus kidney stone versus ectopic versus less likely ovarian torsion as pain is in a sharp/severe or sudden in onset  Will treat with Toradol and obtain labs and imaging for further evaluation  Discussed that the CT scan has no findings to suggest appendicitis, but the appendix was not fully visualized  Remainder of CT is negative  Recommended move up with primary care doctor, observing symptoms and to return to the emergency department for any worsening  Disposition  Final diagnoses:   Right lower quadrant abdominal pain     Time reflects when diagnosis was documented in both MDM as applicable and the Disposition within this note     Time User Action Codes Description Comment    7/27/2022  9:14 PM Crista Smith Add [R10 31] Right lower quadrant abdominal pain       ED Disposition     ED Disposition   Discharge    Condition   Stable    Date/Time   Wed Jul 27, 2022  9:11 PM    [address] discharge to home/self care                 Follow-up Information     Follow up With Specialties Details Why Contact Info Additional [address] [facility] Schedule an appointment as soon as possible for a visit in 3 days for re-evaluation [address]  [facility] Emergency Department Emergency Medicine Go to  As needed, If symptoms worsen, for re-evaluation [address] [place],[address] [id number]  [address] [facility] Emergency Department, [address] [facility], [facility], [facility] [address]          Discharge Medication List as of 7/27/2022  9:16 PM      CONTINUE these medications which have NOT CHANGED    Details   norgestimate-ethinyl estradiol (Sprintec 28) 0 25-35 MG-MCG per tablet Take 1 tablet by mouth daily, Starting Wed 4/13/2022, Normal             No discharge procedures on file      PDMP Review     None          ED Provider  Electronically Signed by           Robbi Carr DO  07/27/22 3766

## 2022-07-28 NOTE — DISCHARGE INSTRUCTIONS
Your CT today showed: Most of the appendix is felt to be visible as a small tubular air-filled structure in the right lower abdomen  The distal most portion is difficult to confidently see as it seems to merge imperceptibly with the right adnexal structures and adjacent small bowel loops  Overall, nothing to suggest acute appendicitis

## 2023-05-10 NOTE — PROGRESS NOTES
[facility] [address] [place] [address]    ASSESSMENT/PLAN: Lita Chow is a 25 y o  Willardnu [place] who presents for annual gynecologic exam     Encounter for routine gynecologic examination  - Routine well woman exam completed today  - Cervical Cancer Screening: Current ASCCP Guidelines reviewed  Last Pap: Not on file N/A  Next Pap Due: today  - HPV Vaccination status: Immunization series complete  - STI screening offered including HIV testing: not indicated based on hx or requested at time of visit  - Contraceptive counseling discussed  Current contraception: condoms or combination OCPs   - RTO 1 yr     Additional problems addressed during this visit:  1  Encounter for annual routine gynecological examination  -     Liquid-based pap, screening    2  Encounter for BCP (birth control pills) initial prescription  -     norgestimate-ethinyl estradiol (Sprintec 28) 0 25-35 MG-MCG per tablet; Take 1 tablet by mouth daily    - Risk/benefits, side effects and administration reviewed  Questions asked and answered  Verbalizes understanding    - will restart pill with next menses  Reviewed same day and Sunday start  To use condoms until on OCP for 1 month  - RTO 1 yr    CC:  Annual Gynecologic Examination    HPI: Lita Chow is a 25 y o  Meg [place] who presents for annual gynecologic examination  Kristy Gardner presents for gyn exam today  4/25/23 LMP  Menses is regular, flow moderate lasting 4-5 days  Using OCP as birth control method  Happy with method  Last pap smear - N/A  History of abnormal pap smear- N/A  Sexually active- yes, with partner for 4 yrs  HPV vaccine - completed  Does not desire STI testing  6-8 hrs sleep per day  1 servings of calcium rich food per day  4 days exercise per week  1-2 servings of caffeine per day  Safe at home - yes  Wears seat belts  Concerns : will restart pill with next menses  Reviewed same day and Sunday start  To use condoms until on OCP for 1 month  "        The following portions of the patient's history were reviewed and updated as appropriate: allergies, current medications, past family history, past medical history, obstetric history, gynecologic history, past social history, past surgical history and problem list     Review of Systems   Constitutional: Negative for chills, fatigue and fever  Respiratory: Negative for cough and shortness of breath  Cardiovascular: Negative for chest pain, palpitations and leg swelling  Gastrointestinal: Negative for constipation and diarrhea  Genitourinary: Negative for difficulty urinating, dysuria, frequency, menstrual problem, pelvic pain, urgency, vaginal bleeding, vaginal discharge and vaginal pain  Neurological: Negative for light-headedness and headaches  Psychiatric/Behavioral: The patient is not nervous/anxious  Objective:  /74   Ht 5' 4\" (1 626 m)   Wt 59 4 kg (131 lb)   LMP 04/25/2023 (Approximate)   BMI 22 49 kg/m²    Physical Exam  Vitals reviewed  Constitutional:       Appearance: Normal appearance  HENT:      Head: Normocephalic  Neck:      Thyroid: No thyroid mass or thyroid tenderness  Cardiovascular:      Rate and Rhythm: Normal rate and regular rhythm  Heart sounds: Normal heart sounds  Pulmonary:      Effort: Pulmonary effort is normal       Breath sounds: Normal breath sounds  Chest:   Breasts:     Right: No mass, nipple discharge, skin change or tenderness  Left: No mass, nipple discharge, skin change or tenderness  Abdominal:      General: There is no distension  Palpations: There is no mass  Tenderness: There is no abdominal tenderness  There is no guarding  Genitourinary:     General: Normal vulva  Exam position: Lithotomy position  Labia:         Right: No tenderness or lesion  Left: No tenderness or lesion  Vagina: No vaginal discharge, tenderness, bleeding or lesions        Cervix: No discharge, lesion, " erythema or cervical bleeding  Uterus: Normal  Not enlarged and not tender  Adnexa:         Right: No mass, tenderness or fullness  Left: No mass, tenderness or fullness  Musculoskeletal:      Cervical back: Normal range of motion  Lymphadenopathy:      Upper Body:      Right upper body: No axillary adenopathy  Left upper body: No axillary adenopathy  Skin:     General: Skin is warm and dry  Neurological:      Mental Status: She is alert     Psychiatric:         Mood and Affect: Mood normal          Behavior: Behavior normal          Judgment: Judgment normal

## 2023-05-11 ENCOUNTER — NEW PATIENT (OUTPATIENT)
Dept: URBAN - METROPOLITAN AREA CLINIC 6 | Facility: CLINIC | Age: 23
End: 2023-05-11

## 2023-05-11 ENCOUNTER — ANNUAL EXAM (OUTPATIENT)
Dept: OBGYN CLINIC | Facility: MEDICAL CENTER | Age: 23
End: 2023-05-11

## 2023-05-11 VITALS
SYSTOLIC BLOOD PRESSURE: 122 MMHG | DIASTOLIC BLOOD PRESSURE: 74 MMHG | BODY MASS INDEX: 22.36 KG/M2 | HEIGHT: 64 IN | WEIGHT: 131 LBS

## 2023-05-11 DIAGNOSIS — Z01.419 ENCOUNTER FOR ANNUAL ROUTINE GYNECOLOGICAL EXAMINATION: Primary | ICD-10-CM

## 2023-05-11 DIAGNOSIS — Z30.011 ENCOUNTER FOR BCP (BIRTH CONTROL PILLS) INITIAL PRESCRIPTION: ICD-10-CM

## 2023-05-11 DIAGNOSIS — H40.023: ICD-10-CM

## 2023-05-11 PROCEDURE — 92202 OPSCPY EXTND ON/MAC DRAW: CPT

## 2023-05-11 PROCEDURE — 92004 COMPRE OPH EXAM NEW PT 1/>: CPT

## 2023-05-11 PROCEDURE — 92133 CPTRZD OPH DX IMG PST SGM ON: CPT

## 2023-05-11 PROCEDURE — 92020 GONIOSCOPY: CPT

## 2023-05-11 RX ORDER — NORGESTIMATE AND ETHINYL ESTRADIOL 0.25-0.035
1 KIT ORAL DAILY
Qty: 84 TABLET | Refills: 3 | Status: SHIPPED | OUTPATIENT
Start: 2023-05-11

## 2023-05-11 ASSESSMENT — VISUAL ACUITY
OD_CC: 20/20
OS_CC: 20/25

## 2023-05-11 ASSESSMENT — TONOMETRY
OS_IOP_MMHG: 18
OD_IOP_MMHG: 19

## 2023-05-17 LAB
LAB AP GYN PRIMARY INTERPRETATION: NORMAL
Lab: NORMAL

## 2023-06-14 ENCOUNTER — OCCMED (OUTPATIENT)
Dept: URGENT CARE | Facility: CLINIC | Age: 23
End: 2023-06-14

## 2023-06-14 DIAGNOSIS — Z02.1 PRE-EMPLOYMENT HEALTH SCREENING EXAMINATION: Primary | ICD-10-CM

## 2023-06-14 LAB
MEV IGG SER QL IA: ABNORMAL
MUV IGG SER QL IA: NORMAL
RUBV IGG SERPL IA-ACNC: 15.6 IU/ML
VZV IGG SER QL IA: NORMAL

## 2023-06-14 PROCEDURE — 86735 MUMPS ANTIBODY: CPT

## 2023-06-14 PROCEDURE — 86480 TB TEST CELL IMMUN MEASURE: CPT

## 2023-06-14 PROCEDURE — 86787 VARICELLA-ZOSTER ANTIBODY: CPT

## 2023-06-14 PROCEDURE — 86765 RUBEOLA ANTIBODY: CPT

## 2023-06-14 PROCEDURE — 86762 RUBELLA ANTIBODY: CPT

## 2023-06-17 LAB
GAMMA INTERFERON BACKGROUND BLD IA-ACNC: 0.03 IU/ML
M TB IFN-G BLD-IMP: NEGATIVE
M TB IFN-G CD4+ BCKGRND COR BLD-ACNC: 0 IU/ML
M TB IFN-G CD4+ BCKGRND COR BLD-ACNC: 0.01 IU/ML
MITOGEN IGNF BCKGRD COR BLD-ACNC: >10 IU/ML

## 2023-11-15 ENCOUNTER — IOP CHECK (OUTPATIENT)
Dept: URBAN - METROPOLITAN AREA CLINIC 6 | Facility: CLINIC | Age: 23
End: 2023-11-15

## 2023-11-15 DIAGNOSIS — H40.023: ICD-10-CM

## 2023-11-15 PROCEDURE — 92012 INTRM OPH EXAM EST PATIENT: CPT

## 2023-11-15 PROCEDURE — 92083 EXTENDED VISUAL FIELD XM: CPT

## 2023-11-15 ASSESSMENT — TONOMETRY
OD_IOP_MMHG: 13
OS_IOP_MMHG: 14

## 2023-11-15 ASSESSMENT — VISUAL ACUITY
OD_CC: 20/20
OU_CC: J1+
OS_CC: 20/20

## 2024-01-07 ENCOUNTER — HOSPITAL ENCOUNTER (EMERGENCY)
Facility: HOSPITAL | Age: 24
Discharge: HOME | End: 2024-01-07
Attending: EMERGENCY MEDICINE | Admitting: EMERGENCY MEDICINE
Payer: OTHER MISCELLANEOUS

## 2024-01-07 VITALS
HEART RATE: 70 BPM | DIASTOLIC BLOOD PRESSURE: 82 MMHG | TEMPERATURE: 98.1 F | RESPIRATION RATE: 18 BRPM | OXYGEN SATURATION: 98 % | SYSTOLIC BLOOD PRESSURE: 130 MMHG
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
## 2024-01-07 LAB — ALT SERPL W P-5'-P-CCNC: 11 U/L (ref 7–52)

## 2024-01-07 PROCEDURE — 99283 EMERGENCY DEPT VISIT LOW MDM: CPT

## 2024-01-07 PROCEDURE — 87389 HIV-1 AG W/HIV-1&-2 AB AG IA: CPT

## 2024-01-07 PROCEDURE — 86706 HEP B SURFACE ANTIBODY: CPT

## 2024-01-07 PROCEDURE — 86803 HEPATITIS C AB TEST: CPT

## 2024-01-07 PROCEDURE — 99284 EMERGENCY DEPT VISIT MOD MDM: CPT | Performed by: EMERGENCY MEDICINE

## 2024-01-07 PROCEDURE — 84460 ALANINE AMINO (ALT) (SGPT): CPT

## 2024-01-07 PROCEDURE — 36415 COLL VENOUS BLD VENIPUNCTURE: CPT

## 2024-01-07 PROCEDURE — 87340 HEPATITIS B SURFACE AG IA: CPT

## 2024-01-07 PROCEDURE — 86704 HEP B CORE ANTIBODY TOTAL: CPT

## 2024-01-08 LAB
HBV CORE AB SER QL: NORMAL
HBV SURFACE AB SER-ACNC: 5.44 MIU/ML
HBV SURFACE AG SER QL: NORMAL
HCV AB SER QL: NORMAL
HIV 1+2 AB+HIV1 P24 AG SERPL QL IA: NORMAL
HIV 2 AB SERPL QL IA: NORMAL
HIV1 AB SERPL QL IA: NORMAL
HIV1 P24 AG SERPL QL IA: NORMAL

## 2024-01-08 NOTE — ED PROVIDER NOTES
Emergency Department Employee Health Note  Sarina Beebe 23 y.o. female MRN: [id number]  Unit/Bed#: Z2HB/Z2HB Encounter: [id number]        History of Present Illness     Chief Complaint:   Chief Complaint   Patient presents with    Body Fluid Exposure     P4 RN, needle stick, wearing gloves. Pt no known hx of blood borne disease, said pt's attending made aware.      HPI:  Sarina Beebe is a 23 y.o. female who presents with needlestick exposure. 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 She immediately washed the area with soap and water. Last tdap 2021 and hep B 2019.  Mechanism:           HPI  Review of Systems    Historical Information     Immunizations:   Immunization History   Administered Date(s) Administered    DTaP 2001, 2001, 01/23/2002, 03/27/2003, 05/01/2006    HPV9 06/21/2017, 08/23/2017, 05/30/2018    Hep B, Adolescent or Pediatric 2001, 2001, 01/23/2002, 07/26/2019    Hepatitis A 06/21/2017, 05/30/2018    HiB 2001, 2001, 01/23/2002, 10/30/2002    INFLUENZA 11/15/2007, 11/30/2011, 11/12/2013, 10/03/2019, 09/18/2020, 09/28/2021    IPV 2001, 2001, 03/27/2003, 05/01/2006    MMR 07/01/2002, 04/03/2006    Meningococcal B, Recombinant (TRUMENBA) 06/24/2019, 08/10/2020    Meningococcal MCV4P 08/23/2017    Pneumococcal Conjugate PCV 7 2001, 2001, 04/29/2002, 09/22/2003    Tdap 06/30/2021    Tuberculin Skin Test-PPD Intradermal 08/10/2020, 06/30/2021, 07/05/2022    Varicella 07/01/2002, 11/30/2011       Past Medical History:   Diagnosis Date    Menorrhagia with regular cycle        Family History   Problem Relation Age of Onset    Hypertension Father     Seizures Sister     Diabetes Paternal Grandfather      Past Surgical History:   Procedure Laterality Date    WISDOM TOOTH EXTRACTION       Social History     Tobacco Use    Smoking status: Never    Smokeless tobacco: Never   Vaping Use  "   Vaping status: Never Used   Substance Use Topics    Alcohol use: Yes     Alcohol/week: 3.0 standard drinks of alcohol     Types: 1 Glasses of wine, 2 Cans of beer per week     Comment: social    Drug use: Never     E-Cigarette/Vaping    E-Cigarette Use Never User      E-Cigarette/Vaping Substances    Nicotine No     THC No     CBD No     Flavoring No     Other No     Unknown No          Meds/Allergies   Prior to Admission Medications   Prescriptions Last Dose Informant Patient Reported? Taking?   norgestimate-ethinyl estradiol (Sprintec 28) 0.25-35 MG-MCG per tablet   No No   Sig: Take 1 tablet by mouth daily      Facility-Administered Medications: None       No Known Allergies    PHYSICAL EXAM  Physical Exam    Vital Signs  ED Triage Vitals [01/07/24 2227]   Temperature Pulse Respirations Blood Pressure SpO2   98.1 °F (36.7 °C) 70 18 130/82 98 %      Temp Source Heart Rate Source Patient Position - Orthostatic VS BP Location FiO2 (%)   Oral -- -- -- --      Pain Score       No Pain           Vitals:    01/07/24 2227   BP: 130/82   Pulse: 70         Certification of Exposure:    (link to Employee Health Services: [facility] ([email or web address]): find link to BBP Exposure Instructions under important links on center of the page)    The patient is stable and has a history and physical exam consistent with an Blood Exposure, Body Fluid Exposure, and Sharp Injury and based on my assessment this exposure is Significant     If “NonSignificant” nothing further needs to be filled out.  If \"Significant\" please continue with the following questions    For Significant Exposures All of the following items must be completed:     Source Patient Name: Dimas King   Source Patient MRN: [id number]   Was the Source Patient's Provider contacted: exposure panel already in process   Was \"Exposure Panel - Source\" ordered (including Rapid HIV, HBsAg and anti-HCV) for Source Patient: Yes    Exposure " Information    Type of Body Fluid Exposure: blood    Estimated volume of fluid: small up to 5cc     Exposure area: non-intact skin    Skin Integrity: punctured    ED provider should review source patient chart for known history of HIV, Hepatitis B and Hepatitis C infection    Source patient HIV positive: unknown  Was the On-call Infectious Disease Provider contacted: No  Discussed treatment options with/for employee: Yes    Immunization History   Administered Date(s) Administered    DTaP 2001, 2001, 01/23/2002, 03/27/2003, 05/01/2006    HPV9 06/21/2017, 08/23/2017, 05/30/2018    Hep B, Adolescent or Pediatric 2001, 2001, 01/23/2002, 07/26/2019    Hepatitis A 06/21/2017, 05/30/2018    HiB 2001, 2001, 01/23/2002, 10/30/2002    INFLUENZA 11/15/2007, 11/30/2011, 11/12/2013, 10/03/2019, 09/18/2020, 09/28/2021    IPV 2001, 2001, 03/27/2003, 05/01/2006    MMR 07/01/2002, 04/03/2006    Meningococcal B, Recombinant (TRUMENBA) 06/24/2019, 08/10/2020    Meningococcal MCV4P 08/23/2017    Pneumococcal Conjugate PCV 7 2001, 2001, 04/29/2002, 09/22/2003    Tdap 06/30/2021    Tuberculin Skin Test-PPD Intradermal 08/10/2020, 06/30/2021, 07/05/2022    Varicella 07/01/2002, 11/30/2011       Hepatitis B Vaccine History:    Immunization History   Administered Date(s) Administered    DTaP 2001, 2001, 01/23/2002, 03/27/2003, 05/01/2006    HPV9 06/21/2017, 08/23/2017, 05/30/2018    Hep B, Adolescent or Pediatric 2001, 2001, 01/23/2002, 07/26/2019    Hepatitis A 06/21/2017, 05/30/2018    HiB 2001, 2001, 01/23/2002, 10/30/2002    INFLUENZA 11/15/2007, 11/30/2011, 11/12/2013, 10/03/2019, 09/18/2020, 09/28/2021    IPV 2001, 2001, 03/27/2003, 05/01/2006    MMR 07/01/2002, 04/03/2006    Meningococcal B, Recombinant (TRUMENBA) 06/24/2019, 08/10/2020    Meningococcal MCV4P 08/23/2017    Pneumococcal Conjugate PCV 7 2001, 2001,  "04/29/2002, 09/22/2003    Tdap 06/30/2021    Tuberculin Skin Test-PPD Intradermal 08/10/2020, 06/30/2021, 07/05/2022    Varicella 07/01/2002, 11/30/2011       The patient has Previously been vaccinated for Hepatitis B.     HEPATITIS B IMMUNE GLOBULIN:  Not Indicated    Tetanus Vaccine History:    TDAP vaccination date: 2021    The patient has Tdap reported as up to date    Employee/Patient post exposure testing Has been performed.     \"Exposure Panel - Employee Baseline\"  (includes HBsAg, HBsAb, anti-HCV, ALT, HIV) with verbal consent and pretesting counseling for the patient Has been ordered.    Post-exposure Prophylaxis treatment options were discussed today: Patient declined      Visual Acuity      ED Medications  Medications - No data to display    Diagnostic Studies  Results Reviewed       Procedure Component Value Units Date/Time    ALT [[id number]]  (Normal) Collected: 01/07/24 2241    Lab Status: Final result Specimen: Blood from Arm, Left Updated: 01/07/24 2313     ALT 11 U/L     Hepatitis B surface antigen [[id number]] Collected: 01/07/24 2241    Lab Status: In process Specimen: Blood from Arm, Left Updated: 01/07/24 2246    Hepatitis C antibody [[id number]] Collected: 01/07/24 2241    Lab Status: In process Specimen: Blood from Arm, Left Updated: 01/07/24 2246    Hepatitis B surface antibody [[id number]] Collected: 01/07/24 2241    Lab Status: In process Specimen: Blood from Arm, Left Updated: 01/07/24 2246    HIV 1/2 AG/AB w Reflex [facility] for 2 yr old and above [[id number]] Collected: 01/07/24 2241    Lab Status: In process Specimen: Blood from Arm, Left Updated: 01/07/24 2246               No orders to display              Procedures  Procedures         Medical Decision Making  Exposure panel for this patient and source panel for patient she was exposed to were ordered. She declined HIV prophylaxis. She will call employee health for follow up. Patient in agreement with plan and questions were answered. " "Verbalized understanding of return precautions.    Portions or all of this note were generated using voice recognition software.  Occasional wrong word or \"sound a like\" substitutions may have occurred due to the inherent limitations of voice recognition software.  Please interpret any errors within the intended context of the whole sentence or idea.      Amount and/or Complexity of Data Reviewed  Labs: ordered.        Disposition  Final diagnoses:   Needlestick injury accident with exposure to body fluid     Time reflects when diagnosis was documented in both MDM as applicable and the Disposition within this note       Time User Action Codes Description Comment    1/7/2024 10:36 PM Marcello Arevalo Add [W46.1XXA] Needlestick injury accident with exposure to body fluid           ED Disposition       ED Disposition   Discharge    Condition   Stable    Date/Time   Sun Jan 7, 2024 10:36 PM    Comment   Sarina Beebe discharge to home/self care.                   Follow-up Information       Follow up With Specialties Details Why Contact [facility]  Call in 1 day  [address] [place] [address]  [phone]            Discharge Medication List as of 1/7/2024 10:53 PM        CONTINUE these medications which have NOT CHANGED    Details   norgestimate-ethinyl estradiol (Sprintec 28) 0.25-35 MG-MCG per tablet Take 1 tablet by mouth daily, Starting Thu 5/11/2023, Normal             No discharge procedures on file.    PDMP Review       None              ED Provider  Electronically Signed by               Marcello Arevalo MD  01/07/24 9138    "

## 2024-01-08 NOTE — DISCHARGE INSTRUCTIONS
Please complete the workday workplace injury report.    Call employee health for follow up tomorrow.

## 2024-01-09 NOTE — ED ATTENDING ATTESTATION
1/7/2024  ILeydi MD, saw and evaluated the patient. I have discussed the patient with the resident/non-physician practitioner and agree with the resident's/non-physician practitioner's findings, Plan of Care, and MDM as documented in the resident's/non-physician practitioner's note, except where noted. All available labs and Radiology studies were reviewed.  I was present for key portions of any procedure(s) performed by the resident/non-physician practitioner and I was immediately available to provide assistance.       At this point I agree with the current assessment done in the Emergency Department.  I have conducted an independent evaluation of this patient a history and physical is as follows:    23 y.o. female presenting with work related infectious exposure after a needlestick injury. 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 She washed the wound right away. No bleeding. UTD on immunizations. Denies chance of pregnancy.    ED Triage Vitals [01/07/24 2227]   Temperature Pulse Respirations Blood Pressure SpO2   98.1 °F (36.7 °C) 70 18 130/82 98 %      Temp Source Heart Rate Source Patient Position - Orthostatic VS BP Location FiO2 (%)   Oral -- -- -- --      Pain Score       No Pain           Constitutional:  Awake, alert, oriented.  No acute distress.  HEENT:  Normocephalic, atraumatic.  Sclera anicteric, conjunctiva not injected.  Moist oral mucosa.  Cardiac:  Appears well-perfused  Respiratory:  Breathing comfortably on room air  Abdomen:  Nondistended  Extremities:  No deformities, no edema. Left index finger distal phalanx with a small puncture wound, hemostatic.  Integument:  No rashes over exposed areas, cap refill less than 2 seconds  Neurologic:  Awake, alert, and oriented x3.  Nonfocal exam.  Psychiatric:  Normal affect      ED Course       23 y.o. female presenting with work related injury, after needle stick exposure.  VS WNL. Immunizations UTD. Physician caring for source-patient notified by patient's charge nurse and is ordering source-patient labs.   Baseline labs obtained. Unfortunately, no post exposure prophylaxis is available for HCV. Following shared decision making, will hold off on post exposure prophylaxis for HIV.  Follow up with occupational medicine. Patient discharged to home with recommendations for symptom control, return precautions, and plan for follow up.

## 2024-04-16 ENCOUNTER — OFFICE VISIT (OUTPATIENT)
Dept: PHYSICAL THERAPY | Facility: CLINIC | Age: 24
End: 2024-04-16
Payer: COMMERCIAL

## 2024-04-16 DIAGNOSIS — M25.561 ACUTE PAIN OF RIGHT KNEE: Primary | ICD-10-CM

## 2024-04-16 PROCEDURE — 97161 PT EVAL LOW COMPLEX 20 MIN: CPT | Performed by: PHYSICAL THERAPIST

## 2024-04-16 NOTE — PROGRESS NOTES
PT Evaluation     Today's date: 2024  Patient name: Sarina Beebe  : 2000  MRN: [id number]  Referring provider: Travis Hutchinson PT  Dx:   Encounter Diagnosis     ICD-10-CM    1. Acute pain of right knee  M25.561                      Assessment  Assessment details: Pt is a 23 y.o. female presenting to PT with acute R knee pain after squatting after run. PT findings include: mild discomfort with knee loading, especially with resisted isometrics of quad at increased knee flexion angles which correlates to patella being primary pain generator. Pt advised that with irritation at the patella, running half marathon in 1.5 weeks would further aggravate knee further. Pt educated on PT findings, anatomy, biomechanics, POC and rehab course. Provided independent HEP to work on, pt to be placed on hold from PT, likely to improve with appropriate exercise and activity modification. s.   Impairments: impaired physical strength, lacks appropriate home exercise program and pain with function    Symptom irritability: lowUnderstanding of Dx/Px/POC: good   Prognosis: good    Goals  1. Pt will demonstrate understanding of HEP and POC in order to improve compliance with course of rehab in 2 weeks.  2. Pt's pain will be 2/10 or less to allow pt to return to PLOF with least restriction by d/c.   3. Pt's will have painless resisted isometric of quad in order to return to running by d/c.  4. Pt knee flexion ROM will improve without pain in order for patella to sustain increased loads with all activities by d/c.     Plan  Patient would benefit from: skilled physical therapy  Planned modality interventions: low level laser therapy  Planned therapy interventions: joint mobilization, kinesiology taping, manual therapy, patient education, strengthening, stretching, therapeutic activities, therapeutic exercise and home exercise program  Frequency: 2x week  Duration in visits: 1  Treatment plan discussed with: patient      Subjective  Evaluation    History of Present Illness  Mechanism of injury: Pt reports that a few days ago, she went for a run and then did a workout afterwards involving squats. She states that she started with excrutiating pain in the knee and held the rest of her workout. She states that the knee has gradually been getting better. She states that for every day activities her pain has mostly resolved. Only felt it 1x recently at work when needing to get somewhere quickly.     She states she has a half marathon in about 1.5 weeks. She wanted to consult on opinion.   Quality of life: good    Patient Goals  Patient goals for therapy: decreased pain, increased motion, increased strength and independence with ADLs/IADLs    Pain  Current pain ratin  At best pain ratin  At worst pain ratin  Location: R knee  Quality: sharp, dull ache and discomfort  Relieving factors: relaxation, rest and support          Objective    Flowsheet Rows      Flowsheet Row Most Recent Value   PT/OT G-Codes    Current Score 74   Projected Score 95        Knee ROM:  Flex: WNL (end range mild discomfort, no pain)  Ext: WNL    Strength:  Knee ext: 5/5 (discomfort underside knee cap)  Knee flex: 5/5     Functional Observation:  Squat: Good, no deviations/compensations (no subjective pain)  Lunge: Good, no dynamic knee valgus, no quad activation deficits, no apprehension  Single leg squat: Good, no dynamic knee valgus, no apprehension    Patellar grind: negative       Precautions: None      Manuals                                                                 Neuro Re-Ed                                                                                                        Ther Ex             SLR HEP            S/l hip abduction HEP            lunge HEP            squat HEP            Single leg HR HEP            Step up/forward HEP                                      Ther Activity                                       Gait Training                                        Modalities

## 2024-04-19 ENCOUNTER — APPOINTMENT (OUTPATIENT)
Dept: PHYSICAL THERAPY | Facility: CLINIC | Age: 24
End: 2024-04-19
Payer: COMMERCIAL

## 2024-04-22 ENCOUNTER — OFFICE VISIT (OUTPATIENT)
Dept: FAMILY MEDICINE CLINIC | Facility: CLINIC | Age: 24
End: 2024-04-22
Payer: COMMERCIAL

## 2024-04-22 ENCOUNTER — APPOINTMENT (OUTPATIENT)
Dept: LAB | Facility: CLINIC | Age: 24
End: 2024-04-22
Payer: COMMERCIAL

## 2024-04-22 ENCOUNTER — APPOINTMENT (OUTPATIENT)
Dept: PHYSICAL THERAPY | Facility: CLINIC | Age: 24
End: 2024-04-22
Payer: COMMERCIAL

## 2024-04-22 VITALS
SYSTOLIC BLOOD PRESSURE: 110 MMHG | RESPIRATION RATE: 14 BRPM | HEART RATE: 62 BPM | HEIGHT: 64 IN | OXYGEN SATURATION: 99 % | BODY MASS INDEX: 23.22 KG/M2 | DIASTOLIC BLOOD PRESSURE: 78 MMHG | TEMPERATURE: 97.3 F | WEIGHT: 136 LBS

## 2024-04-22 DIAGNOSIS — Z13.6 SCREENING FOR CARDIOVASCULAR CONDITION: ICD-10-CM

## 2024-04-22 DIAGNOSIS — Z13.29 SCREENING FOR THYROID DISORDER: ICD-10-CM

## 2024-04-22 DIAGNOSIS — Z13.1 SCREENING FOR DIABETES MELLITUS: ICD-10-CM

## 2024-04-22 DIAGNOSIS — R53.83 OTHER FATIGUE: ICD-10-CM

## 2024-04-22 DIAGNOSIS — E55.9 VITAMIN D DEFICIENCY: ICD-10-CM

## 2024-04-22 DIAGNOSIS — Z13.228 SCREENING FOR METABOLIC DISORDER: ICD-10-CM

## 2024-04-22 DIAGNOSIS — Z13.220 SCREENING FOR LIPID DISORDERS: ICD-10-CM

## 2024-04-22 DIAGNOSIS — R53.83 OTHER FATIGUE: Primary | ICD-10-CM

## 2024-04-22 LAB
25(OH)D3 SERPL-MCNC: 41.5 NG/ML (ref 30–100)
BASOPHILS # BLD AUTO: 0.09 THOUSANDS/ÂΜL (ref 0–0.1)
BASOPHILS NFR BLD AUTO: 1 % (ref 0–1)
EOSINOPHIL # BLD AUTO: 0.22 THOUSAND/ÂΜL (ref 0–0.61)
EOSINOPHIL NFR BLD AUTO: 3 % (ref 0–6)
ERYTHROCYTE [DISTWIDTH] IN BLOOD BY AUTOMATED COUNT: 11.9 % (ref 11.6–15.1)
EST. AVERAGE GLUCOSE BLD GHB EST-MCNC: 105 MG/DL
FERRITIN SERPL-MCNC: 25 NG/ML (ref 11–307)
HBA1C MFR BLD: 5.3 %
HCT VFR BLD AUTO: 40.3 % (ref 34.8–46.1)
HGB BLD-MCNC: 13.8 G/DL (ref 11.5–15.4)
IMM GRANULOCYTES # BLD AUTO: 0.02 THOUSAND/UL (ref 0–0.2)
IMM GRANULOCYTES NFR BLD AUTO: 0 % (ref 0–2)
LYMPHOCYTES # BLD AUTO: 2.48 THOUSANDS/ÂΜL (ref 0.6–4.47)
LYMPHOCYTES NFR BLD AUTO: 32 % (ref 14–44)
MCH RBC QN AUTO: 31.4 PG (ref 26.8–34.3)
MCHC RBC AUTO-ENTMCNC: 34.2 G/DL (ref 31.4–37.4)
MCV RBC AUTO: 92 FL (ref 82–98)
MONOCYTES # BLD AUTO: 0.46 THOUSAND/ÂΜL (ref 0.17–1.22)
MONOCYTES NFR BLD AUTO: 6 % (ref 4–12)
NEUTROPHILS # BLD AUTO: 4.46 THOUSANDS/ÂΜL (ref 1.85–7.62)
NEUTS SEG NFR BLD AUTO: 58 % (ref 43–75)
NRBC BLD AUTO-RTO: 0 /100 WBCS
PLATELET # BLD AUTO: 275 THOUSANDS/UL (ref 149–390)
PMV BLD AUTO: 11.1 FL (ref 8.9–12.7)
RBC # BLD AUTO: 4.4 MILLION/UL (ref 3.81–5.12)
TSH SERPL DL<=0.05 MIU/L-ACNC: 2.64 UIU/ML (ref 0.45–4.5)
VIT B12 SERPL-MCNC: 257 PG/ML (ref 180–914)
WBC # BLD AUTO: 7.73 THOUSAND/UL (ref 4.31–10.16)

## 2024-04-22 PROCEDURE — 85025 COMPLETE CBC W/AUTO DIFF WBC: CPT

## 2024-04-22 PROCEDURE — 82607 VITAMIN B-12: CPT

## 2024-04-22 PROCEDURE — 82728 ASSAY OF FERRITIN: CPT

## 2024-04-22 PROCEDURE — 80061 LIPID PANEL: CPT

## 2024-04-22 PROCEDURE — 36415 COLL VENOUS BLD VENIPUNCTURE: CPT

## 2024-04-22 PROCEDURE — 82306 VITAMIN D 25 HYDROXY: CPT

## 2024-04-22 PROCEDURE — 83550 IRON BINDING TEST: CPT

## 2024-04-22 PROCEDURE — 83540 ASSAY OF IRON: CPT

## 2024-04-22 PROCEDURE — 83036 HEMOGLOBIN GLYCOSYLATED A1C: CPT

## 2024-04-22 PROCEDURE — 99213 OFFICE O/P EST LOW 20 MIN: CPT | Performed by: NURSE PRACTITIONER

## 2024-04-22 PROCEDURE — 84443 ASSAY THYROID STIM HORMONE: CPT

## 2024-04-22 PROCEDURE — 80053 COMPREHEN METABOLIC PANEL: CPT

## 2024-04-22 NOTE — PROGRESS NOTES
"Assessment/Plan:     Diagnoses and all orders for this visit:    Other fatigue  -     CBC and differential; Future  -     Comprehensive metabolic panel; Future  -     Lipid panel; Future  -     TSH, 3rd generation with Free T4 reflex; Future  -     Vitamin D 25 hydroxy; Future  -     Hemoglobin A1C; Future  -     Iron Panel (Includes Ferritin, Iron Sat%, Iron, and TIBC); Future  -     Vitamin B12; Future    Labs ordered for further evaluation. We will contact pt w/ results once available. Patient is encouraged to call our office for any questions/concerns, persistent or worsening symptoms. Patient states they understand and agree with treatment plan.     Screening for cardiovascular condition  -     CBC and differential; Future    Screening for metabolic disorder  -     Comprehensive metabolic panel; Future    Screening for lipid disorders  -     Lipid panel; Future    Screening for thyroid disorder  -     TSH, 3rd generation with Free T4 reflex; Future    Vitamin D deficiency  -     Vitamin D 25 hydroxy; Future    Screening for diabetes mellitus  -     Hemoglobin A1C; Future            Pt to f/u PRN.  F/u pending results.    Subjective:      Patient ID: Sarina Beebe is a 23 y.o. female.    Pt presents today for concerns of fatigue over the last several months.  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  She thought this would help her fatigue but it has not.  She does not take any vitamins.  Pt notes her periods are normal, not heavy.        The following portions of the patient's history were reviewed and updated as appropriate: allergies, current medications, past family history, past medical history, past social history, past surgical history, and problem list.    Review of Systems    As noted per HPI.    Objective:      /78   Pulse 62   Temp (!) 97.3 °F (36.3 °C)   Resp 14   Ht 5' 4\" (1.626 m)   Wt 61.7 kg (136 lb)  "  SpO2 99%   BMI 23.34 kg/m²          Physical Exam  Vitals reviewed.   Constitutional:       Appearance: Normal appearance.   Pulmonary:      Effort: Pulmonary effort is normal.   Neurological:      Mental Status: She is alert and oriented to person, place, and time. Mental status is at baseline.   Psychiatric:         Mood and Affect: Mood normal.         Behavior: Behavior normal.         Thought Content: Thought content normal.         Judgment: Judgment normal.

## 2024-04-23 DIAGNOSIS — E61.1 LOW IRON: ICD-10-CM

## 2024-04-23 DIAGNOSIS — E53.8 VITAMIN B12 DEFICIENCY: Primary | ICD-10-CM

## 2024-04-23 LAB
ALBUMIN SERPL BCP-MCNC: 4.2 G/DL (ref 3.5–5)
ALP SERPL-CCNC: 42 U/L (ref 34–104)
ALT SERPL W P-5'-P-CCNC: 11 U/L (ref 7–52)
ANION GAP SERPL CALCULATED.3IONS-SCNC: 7 MMOL/L (ref 4–13)
AST SERPL W P-5'-P-CCNC: 18 U/L (ref 13–39)
BILIRUB SERPL-MCNC: 0.34 MG/DL (ref 0.2–1)
BUN SERPL-MCNC: 14 MG/DL (ref 5–25)
CALCIUM SERPL-MCNC: 9.1 MG/DL (ref 8.4–10.2)
CHLORIDE SERPL-SCNC: 103 MMOL/L (ref 96–108)
CHOLEST SERPL-MCNC: 164 MG/DL
CO2 SERPL-SCNC: 26 MMOL/L (ref 21–32)
CREAT SERPL-MCNC: 0.88 MG/DL (ref 0.6–1.3)
GFR SERPL CREATININE-BSD FRML MDRD: 93 ML/MIN/1.73SQ M
GLUCOSE P FAST SERPL-MCNC: 75 MG/DL (ref 65–99)
HDLC SERPL-MCNC: 55 MG/DL
IRON SATN MFR SERPL: 16 % (ref 15–50)
IRON SERPL-MCNC: 77 UG/DL (ref 50–212)
LDLC SERPL CALC-MCNC: 92 MG/DL (ref 0–100)
NONHDLC SERPL-MCNC: 109 MG/DL
POTASSIUM SERPL-SCNC: 4.1 MMOL/L (ref 3.5–5.3)
PROT SERPL-MCNC: 7.6 G/DL (ref 6.4–8.4)
SODIUM SERPL-SCNC: 136 MMOL/L (ref 135–147)
TIBC SERPL-MCNC: 480 UG/DL (ref 250–450)
TRIGL SERPL-MCNC: 85 MG/DL
UIBC SERPL-MCNC: 403 UG/DL (ref 155–355)

## 2024-04-23 RX ORDER — LANOLIN ALCOHOL/MO/W.PET/CERES
1000 CREAM (GRAM) TOPICAL DAILY
Qty: 90 TABLET | Refills: 1 | Status: SHIPPED | OUTPATIENT
Start: 2024-04-23

## 2024-04-23 RX ORDER — FERROUS SULFATE 324(65)MG
324 TABLET, DELAYED RELEASE (ENTERIC COATED) ORAL
Qty: 90 TABLET | Refills: 1 | Status: SHIPPED | OUTPATIENT
Start: 2024-04-23

## 2024-04-24 ENCOUNTER — APPOINTMENT (OUTPATIENT)
Dept: PHYSICAL THERAPY | Facility: CLINIC | Age: 24
End: 2024-04-24
Payer: COMMERCIAL

## 2024-05-02 DIAGNOSIS — Z30.011 ENCOUNTER FOR BCP (BIRTH CONTROL PILLS) INITIAL PRESCRIPTION: ICD-10-CM

## 2024-05-15 NOTE — PROGRESS NOTES
[facility] [address], [place], [place]    ASSESSMENT/PLAN: Sarina Beebe is a 23 y.o.  who presents for annual gynecologic exam.    Encounter for routine gynecologic examination  - Routine well woman exam completed today.  - Cervical Cancer Screening: Current ASCCP Guidelines reviewed. Last Pap: 2023 normal. Next Pap Due:   - HPV Vaccination status: Immunization series complete  - STI screening offered including HIV testing: not indicated based on hx or requested at time of visit  - Contraceptive counseling discussed.  Current contraception: condoms or combination OCPs  - RTO 1 yr     Additional problems addressed during this visit:  1. Encounter for annual routine gynecological examination  2. Encounter for BCP (birth control pills) initial prescription  - will need reorder in 6 months  - Risk/benefits, side effects and administration reviewed. Questions asked and answered. Verbalizes understanding.   - to call if any questions or concerns    CC:  Annual Gynecologic Examination    HPI: Sarina Beebe is a 23 y.o.  who presents for annual gynecologic examination.  Sarina presents for gyn exam today. 2024 LMP. Menses is regular and light,last 4-5. Using OCP as birth control method. Happy with method. Last pap smear 2023. History of abnormal pap smear- no. Sexually active- yes, with partner for 5 yrs. HPV vaccine - completed. Does not desire STI testing. 7 hrs sleep per day. 1-2 servings of calcium rich food per day. 3-4 days exercise per week. 2-3 servings of caffeine per day. Does not perform SBE monthly. Safe at home - yes. Wears seat belts. Concerns: getting  this year. Discussed OCP use.No other concerns.        The following portions of the patient's history were reviewed and updated as appropriate: allergies, current medications, past family history, past medical history, obstetric history, gynecologic history, past social history,  "past surgical history and problem list.    Review of Systems   Constitutional:  Positive for fatigue. Negative for chills and fever.   Respiratory:  Negative for cough and shortness of breath.    Cardiovascular:  Negative for chest pain, palpitations and leg swelling.   Gastrointestinal:  Positive for constipation. Negative for diarrhea.   Genitourinary:  Negative for difficulty urinating, dyspareunia, dysuria, frequency, menstrual problem, pelvic pain, urgency, vaginal bleeding, vaginal discharge and vaginal pain.   Neurological:  Negative for light-headedness and headaches.   Psychiatric/Behavioral:  Negative for self-injury. The patient is not nervous/anxious.          Objective:  /70   Ht 5' 4\" (1.626 m)   Wt 61 kg (134 lb 6.4 oz)   LMP 04/30/2024 (Approximate)   BMI 23.07 kg/m²    Physical Exam  Vitals reviewed.   Constitutional:       Appearance: Normal appearance.   HENT:      Head: Normocephalic.   Neck:      Thyroid: No thyroid mass or thyroid tenderness.   Cardiovascular:      Rate and Rhythm: Normal rate and regular rhythm.      Heart sounds: Normal heart sounds.   Pulmonary:      Effort: Pulmonary effort is normal.      Breath sounds: Normal breath sounds.   Chest:   Breasts:     Right: No mass, nipple discharge, skin change or tenderness.      Left: No mass, nipple discharge, skin change or tenderness.   Abdominal:      General: There is no distension.      Palpations: There is no mass.      Tenderness: There is no abdominal tenderness. There is no guarding.   Genitourinary:     General: Normal vulva.      Exam position: Lithotomy position.      Labia:         Right: No tenderness or lesion.         Left: No tenderness or lesion.       Vagina: No vaginal discharge, tenderness, bleeding or lesions.      Cervix: No discharge, lesion, erythema or cervical bleeding.      Uterus: Normal. Not enlarged and not tender.       Adnexa:         Right: No mass, tenderness or fullness.          Left: No " mass, tenderness or fullness.     Musculoskeletal:      Cervical back: Normal range of motion.   Lymphadenopathy:      Upper Body:      Right upper body: No axillary adenopathy.      Left upper body: No axillary adenopathy.   Skin:     General: Skin is warm and dry.   Neurological:      Mental Status: She is alert.   Psychiatric:         Mood and Affect: Mood normal.         Behavior: Behavior normal.         Judgment: Judgment normal.

## 2024-05-16 ENCOUNTER — ANNUAL EXAM (OUTPATIENT)
Dept: OBGYN CLINIC | Facility: MEDICAL CENTER | Age: 24
End: 2024-05-16
Payer: COMMERCIAL

## 2024-05-16 VITALS
WEIGHT: 134.4 LBS | SYSTOLIC BLOOD PRESSURE: 104 MMHG | DIASTOLIC BLOOD PRESSURE: 70 MMHG | HEIGHT: 64 IN | BODY MASS INDEX: 22.94 KG/M2

## 2024-05-16 DIAGNOSIS — Z30.011 ENCOUNTER FOR BCP (BIRTH CONTROL PILLS) INITIAL PRESCRIPTION: ICD-10-CM

## 2024-05-16 DIAGNOSIS — Z01.419 ENCOUNTER FOR ANNUAL ROUTINE GYNECOLOGICAL EXAMINATION: Primary | ICD-10-CM

## 2024-05-16 PROCEDURE — S0612 ANNUAL GYNECOLOGICAL EXAMINA: HCPCS | Performed by: ADVANCED PRACTICE MIDWIFE

## 2024-06-03 ENCOUNTER — TELEPHONE (OUTPATIENT)
Age: 24
End: 2024-06-03

## 2024-06-03 ENCOUNTER — APPOINTMENT (OUTPATIENT)
Dept: LAB | Facility: CLINIC | Age: 24
End: 2024-06-03
Payer: COMMERCIAL

## 2024-06-03 DIAGNOSIS — R39.9 URINARY SYMPTOM OR SIGN: Primary | ICD-10-CM

## 2024-06-03 LAB
BACTERIA UR QL AUTO: NORMAL /HPF
BILIRUB UR QL STRIP: ABNORMAL
CLARITY UR: CLEAR
COLOR UR: ABNORMAL
GLUCOSE UR STRIP-MCNC: NEGATIVE MG/DL
HGB UR QL STRIP.AUTO: ABNORMAL
KETONES UR STRIP-MCNC: NEGATIVE MG/DL
LEUKOCYTE ESTERASE UR QL STRIP: ABNORMAL
NITRITE UR QL STRIP: POSITIVE
NON-SQ EPI CELLS URNS QL MICRO: NORMAL /HPF
PH UR STRIP.AUTO: 6 [PH]
PROT UR STRIP-MCNC: NEGATIVE MG/DL
RBC #/AREA URNS AUTO: NORMAL /HPF
SP GR UR STRIP.AUTO: 1.01 (ref 1–1.03)
UROBILINOGEN UR STRIP-ACNC: 2 MG/DL
WBC #/AREA URNS AUTO: NORMAL /HPF

## 2024-06-03 PROCEDURE — 81001 URINALYSIS AUTO W/SCOPE: CPT

## 2024-06-03 NOTE — TELEPHONE ENCOUNTER
Received call from bakari. She also sent in patient message. She has frequency, burning and pain with urination. She is wondering if anbx can be ordered. She works all week from 7 to 7 however she works at [employer] and could possibly run over to the lab on a break to give a urine sample. Please advise.

## 2024-06-04 DIAGNOSIS — N30.91 CYSTITIS WITH HEMATURIA: Primary | ICD-10-CM

## 2024-06-04 RX ORDER — NITROFURANTOIN 25; 75 MG/1; MG/1
100 CAPSULE ORAL 2 TIMES DAILY
Qty: 10 CAPSULE | Refills: 0 | Status: SHIPPED | OUTPATIENT
Start: 2024-06-04 | End: 2024-06-09

## 2024-07-28 DIAGNOSIS — Z30.011 ENCOUNTER FOR BCP (BIRTH CONTROL PILLS) INITIAL PRESCRIPTION: ICD-10-CM

## 2024-07-28 RX ORDER — NORGESTIMATE AND ETHINYL ESTRADIOL 0.25-0.035
1 KIT ORAL DAILY
Qty: 84 TABLET | Refills: 1 | Status: SHIPPED | OUTPATIENT
Start: 2024-07-28

## 2024-10-10 ENCOUNTER — NURSE TRIAGE (OUTPATIENT)
Age: 24
End: 2024-10-10

## 2024-10-10 ENCOUNTER — TELEPHONE (OUTPATIENT)
Dept: OBGYN CLINIC | Facility: MEDICAL CENTER | Age: 24
End: 2024-10-10

## 2024-10-10 NOTE — TELEPHONE ENCOUNTER
Pt called regarding scheduling an appt due to a tender vaginal lump. Pt states she has felt it grow within the last few days and is getting concerned. No available appointments till mid November. NIKHIL tello stated pt should be seen sooner. Will call pt back if there is anything available.

## 2024-10-10 NOTE — TELEPHONE ENCOUNTER
"Patient called in reporting a lump on the inside of her right labia. At first she thought it was a pimple, but now it has grown a bit bigger and she does not actually see a pimple head. Denies any drainage, no vaginal discharge, itching or odor. Reports bump being noticeable 2/10 discomfort level. Has been applying warm compresses. Attempted to schedule patient for evaluation however no availability in the next few days. Called over to the office and spoke with Jordyn. She advises she will call patient back. Will route message to the clerical pool. Discussed with patient in the meantime, applying warm compresses and tylenol or motrin prn. Informed to contact the office back with any new or worsening symptoms.     Reason for Disposition   Tender lump (swelling or 'ball') at vaginal opening    Answer Assessment - Initial Assessment Questions  1. SYMPTOM: \"What's the main symptom you're concerned about?\" (e.g., rash, itching, swelling, dryness)      Vaginal pimple/cyst on labia  2. LOCATION: \"Where is the  Pimple/cyst located?\" (e.g., inside/outside, left/right)      Right labia  3. ONSET: \"When did the  lump  start?\"      4-5 days ago  4. PAIN: \"Is there any pain?\" If Yes, ask: \"How bad is it?\" (Scale: 1-10; mild, moderate, severe)    -  MILD (1-3): doesn't interfere with normal activities     -  MODERATE (4-7): interferes with normal activities (e.g., work or school) or awakens from sleep      -  SEVERE (8-10): excruciating pain, unable to do any normal activities      2/10  5. CAUSE: \"What do you think is causing the symptoms?\"      Unsure   6. OTHER SYMPTOMS: \"Do you have any other symptoms?\" (e.g., fever, vaginal bleeding, pain with urination)      Denies  7. PREGNANCY: \"Is there any chance you are pregnant?\" \"When was your last menstrual period?\"      Denies    Protocols used: Vulvar Symptoms-ADULT-[place]    "

## 2024-10-10 NOTE — TELEPHONE ENCOUNTER
Called PT back and scheduled her for the soonest 11/5/24(so she at least had an appt.) Will call her back if there is anything sooner,put on wait list as well.

## 2024-10-11 ENCOUNTER — OFFICE VISIT (OUTPATIENT)
Age: 24
End: 2024-10-11
Payer: COMMERCIAL

## 2024-10-11 VITALS
WEIGHT: 135.2 LBS | HEIGHT: 64 IN | BODY MASS INDEX: 23.08 KG/M2 | DIASTOLIC BLOOD PRESSURE: 64 MMHG | SYSTOLIC BLOOD PRESSURE: 118 MMHG

## 2024-10-11 DIAGNOSIS — N76.4 LABIAL ABSCESS: Primary | ICD-10-CM

## 2024-10-11 PROCEDURE — 99213 OFFICE O/P EST LOW 20 MIN: CPT | Performed by: OBSTETRICS & GYNECOLOGY

## 2024-10-11 PROCEDURE — 56405 I&D VULVA/PERINEAL ABSCESS: CPT | Performed by: OBSTETRICS & GYNECOLOGY

## 2024-10-11 RX ORDER — SULFAMETHOXAZOLE/TRIMETHOPRIM 800-160 MG
1 TABLET ORAL EVERY 12 HOURS SCHEDULED
Qty: 14 TABLET | Refills: 0 | Status: SHIPPED | OUTPATIENT
Start: 2024-10-11 | End: 2024-10-18

## 2024-10-11 NOTE — PROGRESS NOTES
"Incision and drain    Date/Time: 10/11/2024 1:30 PM    Performed by: Ainsley Mcarthur MD  Authorized by: Ainsley Mcarthur MD  Universal Protocol:  Consent: Verbal consent obtained.  Risks and benefits: risks, benefits and alternatives were discussed  Consent given by: patient  Time out: Immediately prior to procedure a \"time out\" was called to verify the correct patient, procedure, equipment, support staff and site/side marked as required.  Patient understanding: patient states understanding of the procedure being performed  Required items: required blood products, implants, devices, and special equipment available  Patient identity confirmed: verbally with patient    Patient location:  Clinic  Location:     Type:  Abscess    Size:  2 cm    Location:  Anogenital    Anogenital location:  Vulva  Pre-procedure details:     Skin preparation:  Betadine  Anesthesia (see MAR for exact dosages):     Anesthesia method:  Local infiltration    Local anesthetic:  Lidocaine 2% WITH epi  Procedure details:     Complexity:  Simple    Needle aspiration: no      Incision types:  Stab incision    Scalpel blade:  11    Approach:  Puncture    Incision depth:  Dermal    Wound management:  Probed and deloculated    Drainage:  Purulent    Drainage amount:  Moderate    Wound treatment:  Wound left open    Packing materials:  None  Post-procedure details:     Patient tolerance of procedure:  Tolerated well, no immediate complications    "

## 2024-10-11 NOTE — PROGRESS NOTES
Subjective:       Sarina Beebe is a 24 y.o. female who presents for evaluation of a probable vulvar abscess. Lesion is located in the right labia. Onset was 1 week ago. Symptoms have gradually worsened.    Abscess has associated symptoms of pain. Patient does not have previous history of cutaneous abscesses. Patient does not have diabetes.    PMH: NONE    The following portions of the patient's history were reviewed and updated as appropriate: allergies, current medications, past family history, past medical history, past social history, past surgical history, and problem list.      Objective:     Vitals:    10/11/24 1327   BP: 118/64          There is an area characterized by fluctuance, tenderness measuring 2 cm in greatest dimension. Location: right labia.    Procedure  Informed consent obtained.  The area was prepped in the usual manner and the skin overlying the abscess was anesthetized with 2 cc of 2% lidocaine with epinephrine.  The area was sharply incised and approx 3-4 ccs of purulent material was obtained.     Packing was not inserted.      Assessment:       Right labial abscess     Plan:     Sitz baths x 48 hours  No exercise or IC until site closed  Bactrim DS x 7d to ensure MRSA coverage

## 2024-10-19 ENCOUNTER — DOCUMENTATION (OUTPATIENT)
Dept: FAMILY MEDICINE CLINIC | Facility: CLINIC | Age: 24
End: 2024-10-19

## 2024-10-19 DIAGNOSIS — T78.40XA ALLERGIC REACTION, INITIAL ENCOUNTER: Primary | ICD-10-CM

## 2024-10-19 RX ORDER — METHYLPREDNISOLONE 4 MG/1
TABLET ORAL
Qty: 21 EACH | Refills: 0 | Status: SHIPPED | OUTPATIENT
Start: 2024-10-19

## 2025-05-21 NOTE — PROGRESS NOTES
Name: Sarina Beebe      : 2000      MRN: [id number]  Encounter Provider: Wilma Haro CNM  Encounter Date: 2025   Encounter department: [facility]  :  Assessment & Plan  Encounter for annual routine gynecological examination  - Routine well woman exam completed today.  - Cervical Cancer Screening: Current ASCCP Guidelines reviewed. Last Pap: 2023 normal. Next Pap Due:   - HPV Vaccination status: Immunization series complete  - STI screening offered including HIV testing: not indicated based on hx or requested at time of visit  - Contraceptive counseling discussed.  Current contraception: none:    - RTO 1 yr           History of Present Illness   Sarina presents for gyn exam today. 2025 LMP. Menses is monthly, flow light and lasts for 4 days, notes occasional cramping. Using no birth control method. Would welcome a pregnancy- is using the [employer] ring to monitor cycle. Last pap smear . History of abnormal pap smear- no. Sexually active- yes, with partner for 5 yrs. HPV vaccine - completed. Does not desire STI testing. 7 hrs sleep per day. 2-3 servings of calcium rich food per day. Trying to  exercise regularly. 1-2 servings of caffeine per day. Breast changes: none. Safe at home - yes. Concerns : none      Sarina Beebe is a 24 y.o. female who presents for annual gyn exam  History obtained from: patient    Review of Systems   Constitutional:  Negative for chills, fatigue, fever and unexpected weight change.   Respiratory:  Negative for cough and shortness of breath.    Cardiovascular:  Negative for chest pain, palpitations and leg swelling.   Gastrointestinal:  Negative for abdominal pain, constipation and diarrhea.   Genitourinary:  Negative for difficulty urinating, dyspareunia, dysuria, frequency, menstrual problem, pelvic pain, urgency, vaginal bleeding, vaginal discharge and vaginal pain.   Neurological:  Negative for dizziness and headaches.  "  Psychiatric/Behavioral:  Negative for self-injury. The patient is not nervous/anxious.      Medical History Reviewed by provider this encounter:     .  Medications Ordered Prior to Encounter[1]   Social History[2]     Objective   /60   Ht 5' 4\" (1.626 m)   Wt 65.5 kg (144 lb 4.8 oz)   LMP 05/11/2025 (Approximate)   BMI 24.77 kg/m²      Physical Exam  Vitals reviewed.   Constitutional:       Appearance: Normal appearance.   Neck:      Thyroid: No thyroid mass or thyroid tenderness.     Cardiovascular:      Rate and Rhythm: Normal rate.   Pulmonary:      Effort: Pulmonary effort is normal.   Chest:   Breasts:     Right: No mass, nipple discharge, skin change or tenderness.      Left: No mass, nipple discharge, skin change or tenderness.   Abdominal:      Tenderness: There is no abdominal tenderness. There is no guarding or rebound.   Genitourinary:     General: Normal vulva.      Exam position: Lithotomy position.      Labia:         Right: No rash, tenderness or lesion.         Left: No rash, tenderness or lesion.       Urethra: No urethral pain, urethral swelling or urethral lesion.      Vagina: No vaginal discharge, erythema, tenderness, bleeding or lesions.      Cervix: No cervical motion tenderness, discharge, friability, lesion or erythema.      Uterus: Normal. Not enlarged and not tender.       Adnexa:         Right: No mass, tenderness or fullness.          Left: No mass, tenderness or fullness.     Lymphadenopathy:      Upper Body:      Right upper body: No axillary adenopathy.      Left upper body: No axillary adenopathy.     Neurological:      Mental Status: She is alert and oriented to person, place, and time.     Psychiatric:         Mood and Affect: Mood normal.         Behavior: Behavior normal.            [1]   Current Outpatient Medications on File Prior to Visit   Medication Sig Dispense Refill    [DISCONTINUED] ferrous sulfate 324 (65 Fe) mg Take 1 tablet (324 mg total) by mouth daily " before breakfast (Patient not taking: Reported on 5/22/2025) 90 tablet 1    [DISCONTINUED] methylPREDNISolone 4 MG tablet therapy pack Use as directed on package (Patient not taking: Reported on 5/22/2025) 21 each 0    [DISCONTINUED] norgestimate-ethinyl estradiol (Sprintec 28) 0.25-35 MG-MCG per tablet Take 1 tablet by mouth daily (Patient not taking: Reported on 5/22/2025) 84 tablet 1    [DISCONTINUED] vitamin B-12 (VITAMIN B-12) 1,000 mcg tablet Take 1 tablet (1,000 mcg total) by mouth daily (Patient not taking: Reported on 5/22/2025) 90 tablet 1     No current facility-administered medications on file prior to visit.   [2]   Social History  Tobacco Use    Smoking status: Never    Smokeless tobacco: Never   Vaping Use    Vaping status: Never Used   Substance and Sexual Activity    Alcohol use: Yes     Alcohol/week: 3.0 standard drinks of alcohol     Types: 1 Glasses of wine, 2 Cans of beer per week     Comment: social    Drug use: Never    Sexual activity: Yes     Partners: Male     Birth control/protection: Condom Male

## 2025-05-22 ENCOUNTER — ANNUAL EXAM (OUTPATIENT)
Dept: OBGYN CLINIC | Facility: MEDICAL CENTER | Age: 25
End: 2025-05-22
Payer: COMMERCIAL

## 2025-05-22 VITALS
WEIGHT: 144.3 LBS | BODY MASS INDEX: 24.63 KG/M2 | DIASTOLIC BLOOD PRESSURE: 60 MMHG | HEIGHT: 64 IN | SYSTOLIC BLOOD PRESSURE: 100 MMHG

## 2025-05-22 DIAGNOSIS — Z01.419 ENCOUNTER FOR ANNUAL ROUTINE GYNECOLOGICAL EXAMINATION: Primary | ICD-10-CM

## 2025-05-22 PROCEDURE — S0612 ANNUAL GYNECOLOGICAL EXAMINA: HCPCS | Performed by: ADVANCED PRACTICE MIDWIFE
